# Patient Record
Sex: FEMALE | Race: WHITE | NOT HISPANIC OR LATINO | Employment: UNEMPLOYED | URBAN - NONMETROPOLITAN AREA
[De-identification: names, ages, dates, MRNs, and addresses within clinical notes are randomized per-mention and may not be internally consistent; named-entity substitution may affect disease eponyms.]

---
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
## 2019-03-27 ENCOUNTER — TRANSCRIBE ORDERS (OUTPATIENT)
Dept: ADMINISTRATIVE | Facility: HOSPITAL | Age: 19
End: 2019-03-27

## 2019-03-27 DIAGNOSIS — K31.84 GASTROPARESIS: Primary | ICD-10-CM

## 2019-06-08 ENCOUNTER — APPOINTMENT (OUTPATIENT)
Dept: GENERAL RADIOLOGY | Facility: HOSPITAL | Age: 19
End: 2019-06-08

## 2019-06-08 ENCOUNTER — APPOINTMENT (OUTPATIENT)
Dept: CT IMAGING | Facility: HOSPITAL | Age: 19
End: 2019-06-08

## 2019-06-08 ENCOUNTER — HOSPITAL ENCOUNTER (OUTPATIENT)
Facility: HOSPITAL | Age: 19
Setting detail: OBSERVATION
Discharge: HOME | End: 2019-06-09
Attending: EMERGENCY MEDICINE | Admitting: FAMILY MEDICINE

## 2019-06-08 DIAGNOSIS — N39.0 URINARY TRACT INFECTION WITHOUT HEMATURIA, SITE UNSPECIFIED: Primary | ICD-10-CM

## 2019-06-08 DIAGNOSIS — R50.9 FEVER, UNSPECIFIED FEVER CAUSE: ICD-10-CM

## 2019-06-08 DIAGNOSIS — R10.9 FLANK PAIN: ICD-10-CM

## 2019-06-08 PROBLEM — R59.0 POSTERIOR CERVICAL LYMPHADENOPATHY: Status: ACTIVE | Noted: 2019-06-08

## 2019-06-08 PROBLEM — Z86.19 HISTORY OF MONONUCLEOSIS: Status: ACTIVE | Noted: 2019-06-08

## 2019-06-08 PROBLEM — R52 INTRACTABLE PAIN: Status: ACTIVE | Noted: 2019-06-08

## 2019-06-08 PROBLEM — R30.0 DYSURIA: Status: ACTIVE | Noted: 2019-06-08

## 2019-06-08 PROBLEM — M62.830 SPASM OF MUSCLE OF LOWER BACK: Status: ACTIVE | Noted: 2019-06-08

## 2019-06-08 PROBLEM — Z86.19 HISTORY OF HELICOBACTER PYLORI INFECTION: Status: ACTIVE | Noted: 2019-06-08

## 2019-06-08 PROBLEM — R59.1 LYMPHADENOPATHY: Status: ACTIVE | Noted: 2019-06-08

## 2019-06-08 LAB
ALBUMIN SERPL-MCNC: 4.1 G/DL (ref 3.5–5.2)
ALBUMIN/GLOB SERPL: 1.3 G/DL
ALP SERPL-CCNC: 71 U/L (ref 39–117)
ALT SERPL W P-5'-P-CCNC: 7 U/L (ref 1–33)
AMPHET+METHAMPHET UR QL: NEGATIVE
AMPHETAMINES UR QL: NEGATIVE
ANION GAP SERPL CALCULATED.3IONS-SCNC: 14 MMOL/L
AST SERPL-CCNC: 15 U/L (ref 1–32)
B-HCG UR QL: NEGATIVE
BACTERIA UR QL AUTO: ABNORMAL /HPF
BARBITURATES UR QL SCN: NEGATIVE
BASOPHILS # BLD AUTO: 0.02 10*3/MM3 (ref 0–0.2)
BASOPHILS NFR BLD AUTO: 0.2 % (ref 0–1.5)
BENZODIAZ UR QL SCN: POSITIVE
BILIRUB SERPL-MCNC: 0.4 MG/DL (ref 0.2–1.2)
BILIRUB UR QL STRIP: NEGATIVE
BUN BLD-MCNC: 7 MG/DL (ref 6–20)
BUN/CREAT SERPL: 8.2 (ref 7–25)
BUPRENORPHINE SERPL-MCNC: NEGATIVE NG/ML
CALCIUM SPEC-SCNC: 9 MG/DL (ref 8.6–10.5)
CANNABINOIDS SERPL QL: POSITIVE
CHLORIDE SERPL-SCNC: 100 MMOL/L (ref 98–107)
CLARITY UR: CLEAR
CO2 SERPL-SCNC: 22 MMOL/L (ref 22–29)
COCAINE UR QL: NEGATIVE
COLOR UR: YELLOW
CREAT BLD-MCNC: 0.85 MG/DL (ref 0.57–1)
D-LACTATE SERPL-SCNC: 1.2 MMOL/L (ref 0.5–2)
DEPRECATED RDW RBC AUTO: 48.7 FL (ref 37–54)
EOSINOPHIL # BLD AUTO: 0 10*3/MM3 (ref 0–0.4)
EOSINOPHIL NFR BLD AUTO: 0 % (ref 0.3–6.2)
ERYTHROCYTE [DISTWIDTH] IN BLOOD BY AUTOMATED COUNT: 14.6 % (ref 12.3–15.4)
FLUAV AG NPH QL: NEGATIVE
FLUBV AG NPH QL IA: NEGATIVE
GFR SERPL CREATININE-BSD FRML MDRD: 86 ML/MIN/1.73
GLOBULIN UR ELPH-MCNC: 3.1 GM/DL
GLUCOSE BLD-MCNC: 90 MG/DL (ref 65–99)
GLUCOSE UR STRIP-MCNC: NEGATIVE MG/DL
HCT VFR BLD AUTO: 39.3 % (ref 34–46.6)
HETEROPH AB SER QL LA: NEGATIVE
HGB BLD-MCNC: 12.5 G/DL (ref 12–15.9)
HGB UR QL STRIP.AUTO: ABNORMAL
HOLD SPECIMEN: NORMAL
HOLD SPECIMEN: NORMAL
HYALINE CASTS UR QL AUTO: ABNORMAL /LPF
IMM GRANULOCYTES # BLD AUTO: 0.12 10*3/MM3 (ref 0–0.05)
IMM GRANULOCYTES NFR BLD AUTO: 1.2 % (ref 0–0.5)
INTERNAL NEGATIVE CONTROL: NORMAL
INTERNAL POSITIVE CONTROL: NORMAL
KETONES UR QL STRIP: ABNORMAL
LEUKOCYTE ESTERASE UR QL STRIP.AUTO: ABNORMAL
LIPASE SERPL-CCNC: 11 U/L (ref 13–60)
LYMPHOCYTES # BLD AUTO: 0.76 10*3/MM3 (ref 0.7–3.1)
LYMPHOCYTES NFR BLD AUTO: 7.4 % (ref 19.6–45.3)
Lab: NORMAL
MCH RBC QN AUTO: 28.7 PG (ref 26.6–33)
MCHC RBC AUTO-ENTMCNC: 31.8 G/DL (ref 31.5–35.7)
MCV RBC AUTO: 90.1 FL (ref 79–97)
METHADONE UR QL SCN: NEGATIVE
MONOCYTES # BLD AUTO: 0.95 10*3/MM3 (ref 0.1–0.9)
MONOCYTES NFR BLD AUTO: 9.2 % (ref 5–12)
NEUTROPHILS # BLD AUTO: 8.46 10*3/MM3 (ref 1.7–7)
NEUTROPHILS NFR BLD AUTO: 82 % (ref 42.7–76)
NITRITE UR QL STRIP: NEGATIVE
NRBC BLD AUTO-RTO: 0 /100 WBC (ref 0–0.2)
OPIATES UR QL: NEGATIVE
OXYCODONE UR QL SCN: NEGATIVE
PCP UR QL SCN: NEGATIVE
PH UR STRIP.AUTO: 6 [PH] (ref 5–8)
PLATELET # BLD AUTO: 169 10*3/MM3 (ref 140–450)
PMV BLD AUTO: 10 FL (ref 6–12)
POTASSIUM BLD-SCNC: 3.5 MMOL/L (ref 3.5–5.2)
PROCALCITONIN SERPL-MCNC: 0.11 NG/ML (ref 0.1–0.25)
PROPOXYPH UR QL: NEGATIVE
PROT SERPL-MCNC: 7.2 G/DL (ref 6–8.5)
PROT UR QL STRIP: ABNORMAL
RBC # BLD AUTO: 4.36 10*6/MM3 (ref 3.77–5.28)
RBC # UR: ABNORMAL /HPF
REF LAB TEST METHOD: ABNORMAL
S PYO AG THROAT QL: NEGATIVE
SODIUM BLD-SCNC: 136 MMOL/L (ref 136–145)
SP GR UR STRIP: 1.03 (ref 1–1.03)
SQUAMOUS #/AREA URNS HPF: ABNORMAL /HPF
TRICYCLICS UR QL SCN: NEGATIVE
UROBILINOGEN UR QL STRIP: ABNORMAL
WBC NRBC COR # BLD: 10.31 10*3/MM3 (ref 3.4–10.8)
WBC UR QL AUTO: ABNORMAL /HPF
WHOLE BLOOD HOLD SPECIMEN: NORMAL
WHOLE BLOOD HOLD SPECIMEN: NORMAL

## 2019-06-08 PROCEDURE — 99284 EMERGENCY DEPT VISIT MOD MDM: CPT

## 2019-06-08 PROCEDURE — 96366 THER/PROPH/DIAG IV INF ADDON: CPT

## 2019-06-08 PROCEDURE — 96361 HYDRATE IV INFUSION ADD-ON: CPT

## 2019-06-08 PROCEDURE — 87081 CULTURE SCREEN ONLY: CPT | Performed by: PHYSICIAN ASSISTANT

## 2019-06-08 PROCEDURE — 85025 COMPLETE CBC W/AUTO DIFF WBC: CPT | Performed by: EMERGENCY MEDICINE

## 2019-06-08 PROCEDURE — 74177 CT ABD & PELVIS W/CONTRAST: CPT

## 2019-06-08 PROCEDURE — 25010000002 METHYLPREDNISOLONE PER 125 MG: Performed by: PHYSICIAN ASSISTANT

## 2019-06-08 PROCEDURE — 86308 HETEROPHILE ANTIBODY SCREEN: CPT | Performed by: PHYSICIAN ASSISTANT

## 2019-06-08 PROCEDURE — 81001 URINALYSIS AUTO W/SCOPE: CPT | Performed by: EMERGENCY MEDICINE

## 2019-06-08 PROCEDURE — 83605 ASSAY OF LACTIC ACID: CPT | Performed by: PHYSICIAN ASSISTANT

## 2019-06-08 PROCEDURE — G0378 HOSPITAL OBSERVATION PER HR: HCPCS

## 2019-06-08 PROCEDURE — 87804 INFLUENZA ASSAY W/OPTIC: CPT | Performed by: PHYSICIAN ASSISTANT

## 2019-06-08 PROCEDURE — 25010000002 KETOROLAC TROMETHAMINE PER 15 MG: Performed by: PHYSICIAN ASSISTANT

## 2019-06-08 PROCEDURE — 87040 BLOOD CULTURE FOR BACTERIA: CPT | Performed by: PHYSICIAN ASSISTANT

## 2019-06-08 PROCEDURE — 71046 X-RAY EXAM CHEST 2 VIEWS: CPT

## 2019-06-08 PROCEDURE — 96376 TX/PRO/DX INJ SAME DRUG ADON: CPT

## 2019-06-08 PROCEDURE — 25010000002 KETOROLAC TROMETHAMINE PER 15 MG: Performed by: NURSE PRACTITIONER

## 2019-06-08 PROCEDURE — 96367 TX/PROPH/DG ADDL SEQ IV INF: CPT

## 2019-06-08 PROCEDURE — 80306 DRUG TEST PRSMV INSTRMNT: CPT | Performed by: NURSE PRACTITIONER

## 2019-06-08 PROCEDURE — 99219 PR INITIAL OBSERVATION CARE/DAY 50 MINUTES: CPT | Performed by: HOSPITALIST

## 2019-06-08 PROCEDURE — 96365 THER/PROPH/DIAG IV INF INIT: CPT

## 2019-06-08 PROCEDURE — 25010000002 DIPHENHYDRAMINE PER 50 MG: Performed by: PHYSICIAN ASSISTANT

## 2019-06-08 PROCEDURE — 96375 TX/PRO/DX INJ NEW DRUG ADDON: CPT

## 2019-06-08 PROCEDURE — 87880 STREP A ASSAY W/OPTIC: CPT | Performed by: PHYSICIAN ASSISTANT

## 2019-06-08 PROCEDURE — 25010000002 VANCOMYCIN 10 G RECONSTITUTED SOLUTION: Performed by: PHYSICIAN ASSISTANT

## 2019-06-08 PROCEDURE — 83690 ASSAY OF LIPASE: CPT | Performed by: EMERGENCY MEDICINE

## 2019-06-08 PROCEDURE — 80053 COMPREHEN METABOLIC PANEL: CPT | Performed by: EMERGENCY MEDICINE

## 2019-06-08 PROCEDURE — 84145 PROCALCITONIN (PCT): CPT | Performed by: PHYSICIAN ASSISTANT

## 2019-06-08 PROCEDURE — 81025 URINE PREGNANCY TEST: CPT | Performed by: EMERGENCY MEDICINE

## 2019-06-08 PROCEDURE — 25010000002 MEROPENEM: Performed by: PHYSICIAN ASSISTANT

## 2019-06-08 PROCEDURE — 87086 URINE CULTURE/COLONY COUNT: CPT | Performed by: PHYSICIAN ASSISTANT

## 2019-06-08 PROCEDURE — 25010000002 IOPAMIDOL 61 % SOLUTION: Performed by: EMERGENCY MEDICINE

## 2019-06-08 PROCEDURE — 25010000002 LORAZEPAM PER 2 MG: Performed by: EMERGENCY MEDICINE

## 2019-06-08 RX ORDER — SODIUM CHLORIDE 0.9 % (FLUSH) 0.9 %
3-10 SYRINGE (ML) INJECTION AS NEEDED
Status: DISCONTINUED | OUTPATIENT
Start: 2019-06-08 | End: 2019-06-09 | Stop reason: HOSPADM

## 2019-06-08 RX ORDER — FAMOTIDINE 20 MG/1
40 TABLET, FILM COATED ORAL DAILY
Status: DISCONTINUED | OUTPATIENT
Start: 2019-06-09 | End: 2019-06-09 | Stop reason: HOSPADM

## 2019-06-08 RX ORDER — ONDANSETRON 2 MG/ML
4 INJECTION INTRAMUSCULAR; INTRAVENOUS EVERY 6 HOURS PRN
Status: DISCONTINUED | OUTPATIENT
Start: 2019-06-08 | End: 2019-06-09 | Stop reason: HOSPADM

## 2019-06-08 RX ORDER — KETOROLAC TROMETHAMINE 15 MG/ML
15 INJECTION, SOLUTION INTRAMUSCULAR; INTRAVENOUS EVERY 6 HOURS PRN
Status: DISCONTINUED | OUTPATIENT
Start: 2019-06-08 | End: 2019-06-09 | Stop reason: HOSPADM

## 2019-06-08 RX ORDER — LORAZEPAM 2 MG/ML
0.5 INJECTION INTRAMUSCULAR ONCE
Status: COMPLETED | OUTPATIENT
Start: 2019-06-08 | End: 2019-06-08

## 2019-06-08 RX ORDER — NORETHINDRONE ACETATE AND ETHINYL ESTRADIOL 1; 5 MG/1; UG/1
1 TABLET ORAL DAILY
Status: DISCONTINUED | OUTPATIENT
Start: 2019-06-08 | End: 2019-06-09 | Stop reason: HOSPADM

## 2019-06-08 RX ORDER — FAMOTIDINE 10 MG/ML
20 INJECTION, SOLUTION INTRAVENOUS ONCE
Status: COMPLETED | OUTPATIENT
Start: 2019-06-08 | End: 2019-06-08

## 2019-06-08 RX ORDER — SODIUM CHLORIDE 0.9 % (FLUSH) 0.9 %
10 SYRINGE (ML) INJECTION AS NEEDED
Status: DISCONTINUED | OUTPATIENT
Start: 2019-06-08 | End: 2019-06-09 | Stop reason: HOSPADM

## 2019-06-08 RX ORDER — ACETAMINOPHEN 325 MG/1
650 TABLET ORAL EVERY 4 HOURS PRN
Status: DISCONTINUED | OUTPATIENT
Start: 2019-06-08 | End: 2019-06-09 | Stop reason: HOSPADM

## 2019-06-08 RX ORDER — KETOROLAC TROMETHAMINE 15 MG/ML
10 INJECTION, SOLUTION INTRAMUSCULAR; INTRAVENOUS ONCE
Status: COMPLETED | OUTPATIENT
Start: 2019-06-08 | End: 2019-06-08

## 2019-06-08 RX ORDER — ONDANSETRON 4 MG/1
4 TABLET, FILM COATED ORAL EVERY 6 HOURS PRN
Status: DISCONTINUED | OUTPATIENT
Start: 2019-06-08 | End: 2019-06-09 | Stop reason: HOSPADM

## 2019-06-08 RX ORDER — SODIUM CHLORIDE 0.9 % (FLUSH) 0.9 %
3 SYRINGE (ML) INJECTION EVERY 12 HOURS SCHEDULED
Status: DISCONTINUED | OUTPATIENT
Start: 2019-06-08 | End: 2019-06-09 | Stop reason: HOSPADM

## 2019-06-08 RX ORDER — SULFAMETHOXAZOLE AND TRIMETHOPRIM 800; 160 MG/1; MG/1
1 TABLET ORAL 2 TIMES DAILY
COMMUNITY
End: 2019-09-10

## 2019-06-08 RX ORDER — ZOLPIDEM TARTRATE 5 MG/1
5 TABLET ORAL NIGHTLY PRN
Status: DISCONTINUED | OUTPATIENT
Start: 2019-06-08 | End: 2019-06-09 | Stop reason: HOSPADM

## 2019-06-08 RX ORDER — DIPHENHYDRAMINE HYDROCHLORIDE 50 MG/ML
25 INJECTION INTRAMUSCULAR; INTRAVENOUS ONCE
Status: COMPLETED | OUTPATIENT
Start: 2019-06-08 | End: 2019-06-08

## 2019-06-08 RX ORDER — METHYLPREDNISOLONE SODIUM SUCCINATE 125 MG/2ML
125 INJECTION, POWDER, LYOPHILIZED, FOR SOLUTION INTRAMUSCULAR; INTRAVENOUS ONCE
Status: COMPLETED | OUTPATIENT
Start: 2019-06-08 | End: 2019-06-08

## 2019-06-08 RX ORDER — HYDROXYZINE HYDROCHLORIDE 25 MG/1
25 TABLET, FILM COATED ORAL 3 TIMES DAILY PRN
Status: DISCONTINUED | OUTPATIENT
Start: 2019-06-08 | End: 2019-06-09 | Stop reason: HOSPADM

## 2019-06-08 RX ORDER — ACETAMINOPHEN 500 MG
1000 TABLET ORAL ONCE
Status: COMPLETED | OUTPATIENT
Start: 2019-06-08 | End: 2019-06-08

## 2019-06-08 RX ORDER — SODIUM CHLORIDE 9 MG/ML
75 INJECTION, SOLUTION INTRAVENOUS CONTINUOUS
Status: ACTIVE | OUTPATIENT
Start: 2019-06-08 | End: 2019-06-09

## 2019-06-08 RX ADMIN — DIPHENHYDRAMINE HYDROCHLORIDE 25 MG: 50 INJECTION INTRAMUSCULAR; INTRAVENOUS at 17:46

## 2019-06-08 RX ADMIN — SODIUM CHLORIDE 2178 ML: 9 INJECTION, SOLUTION INTRAVENOUS at 14:18

## 2019-06-08 RX ADMIN — KETOROLAC TROMETHAMINE 10 MG: 15 INJECTION, SOLUTION INTRAMUSCULAR; INTRAVENOUS at 14:21

## 2019-06-08 RX ADMIN — KETOROLAC TROMETHAMINE 15 MG: 15 INJECTION, SOLUTION INTRAMUSCULAR; INTRAVENOUS at 21:27

## 2019-06-08 RX ADMIN — LORAZEPAM 0.5 MG: 2 INJECTION INTRAMUSCULAR; INTRAVENOUS at 16:45

## 2019-06-08 RX ADMIN — FAMOTIDINE 20 MG: 10 INJECTION, SOLUTION INTRAVENOUS at 17:48

## 2019-06-08 RX ADMIN — VANCOMYCIN HYDROCHLORIDE 1250 MG: 10 INJECTION, POWDER, LYOPHILIZED, FOR SOLUTION INTRAVENOUS at 16:40

## 2019-06-08 RX ADMIN — IOPAMIDOL 90 ML: 612 INJECTION, SOLUTION INTRAVENOUS at 15:21

## 2019-06-08 RX ADMIN — ACETAMINOPHEN 1000 MG: 500 TABLET, FILM COATED ORAL at 15:56

## 2019-06-08 RX ADMIN — MEROPENEM 1 G: 1 INJECTION, POWDER, FOR SOLUTION INTRAVENOUS at 15:57

## 2019-06-08 RX ADMIN — ZOLPIDEM TARTRATE 5 MG: 5 TABLET ORAL at 21:26

## 2019-06-08 RX ADMIN — SODIUM CHLORIDE, PRESERVATIVE FREE 3 ML: 5 INJECTION INTRAVENOUS at 20:32

## 2019-06-08 RX ADMIN — METHYLPREDNISOLONE SODIUM SUCCINATE 125 MG: 125 INJECTION, POWDER, FOR SOLUTION INTRAMUSCULAR; INTRAVENOUS at 17:50

## 2019-06-08 RX ADMIN — SODIUM CHLORIDE 75 ML/HR: 9 INJECTION, SOLUTION INTRAVENOUS at 20:31

## 2019-06-08 NOTE — ED PROVIDER NOTES
Subjective   Isatu Dave is a 18 y.o. female who presents to the ED with complaints of abdominal pain. The patient reports that she went to [facility] yesterday and she was diagnosed with a UTI. She was prescribed Bactrim. She is experiencing dysuria, abdominal cramping, fever, chills, hematuria, cough, lower back pain, vomiting, neck pain, and swollen lymph nodes in her neck. She denies sore throat, hematochezia, vaginal discharge, and diarrhea. She has a history of mono, but was tested yesterday at [facility] and the result was negative. She also has a history of gastroparesis and H. Pylori. She has had an appendectomy. There are no other acute complaints at this time.         History provided by:  Patient  Abdominal Pain   Pain location:  Suprapubic  Pain quality: cramping and sharp    Pain radiates to:  Does not radiate  Pain severity:  Moderate  Onset quality:  Sudden  Timing:  Constant  Chronicity:  New  Associated symptoms: chills, cough, dysuria, fever, hematuria and vomiting    Associated symptoms: no diarrhea, no hematochezia, no sore throat and no vaginal discharge        Review of Systems   Constitutional: Positive for chills and fever.   HENT: Negative for sore throat.    Respiratory: Positive for cough.    Gastrointestinal: Positive for abdominal pain and vomiting. Negative for blood in stool, diarrhea and hematochezia.   Genitourinary: Positive for dysuria and hematuria. Negative for vaginal discharge.   Musculoskeletal: Positive for back pain and neck pain.       Past Medical History:   Diagnosis Date   • Gastroparesis    • H. pylori infection        Allergies   Allergen Reactions   • Darren Flavor Anaphylaxis   • Morphine Anaphylaxis     Throat closes up   • Oxycodone Anaphylaxis     Throat closes up   • Penicillins Anaphylaxis   • Pineapple Anaphylaxis   • Hydrocodone Hives       Past Surgical History:   Procedure Laterality Date   • APPENDECTOMY     • SHOULDER SURGERY     • WISDOM TOOTH EXTRACTION          History reviewed. No pertinent family history.    Social History     Socioeconomic History   • Marital status: Single     Spouse name: Not on file   • Number of children: Not on file   • Years of education: Not on file   • Highest education level: Not on file   Tobacco Use   • Smoking status: Never Smoker   Substance and Sexual Activity   • Alcohol use: No   • Drug use: No         Objective   Physical Exam   Constitutional: She is oriented to person, place, and time. She appears well-developed and well-nourished.  Non-toxic appearance. No distress.   Patient is tearful and very anxious. Febrile with a temperature of 100.3.    HENT:   Head: Normocephalic and atraumatic.   Eyes: Conjunctivae are normal. No scleral icterus.   Neck: Normal range of motion. Neck supple.   Cervical chain adenopathy. No meningismus.    Cardiovascular: Normal rate, regular rhythm and normal heart sounds.   Pulmonary/Chest: Effort normal and breath sounds normal.   Abdominal: Soft. She exhibits no mass. There is tenderness. There is no rebound and no guarding.   Mild right lower quadrant tenderness (status post appendectomy). No flank tenderness. Mild suprapubic tenderness. No palpable organomegaly. No pulsatile masses.    Musculoskeletal: Normal range of motion.   Able to flex knees and hips to chest without difficulty.    Neurological: She is alert and oriented to person, place, and time.   Skin: Skin is warm and dry. No rash noted.   Psychiatric: Her behavior is normal. Her mood appears anxious.   Nursing note and vitals reviewed.      Procedures         ED Course  ED Course as of Jun 08 1751   Sat Jun 08, 2019   1451 Squamous Epithelial Cells, UA: (!) 7-12 [TG]   1451 WBC, UA: (!) 13-20 [TG]   1451 RBC, UA: (!) 7-12 [TG]   1451 Nitrite, UA: Negative [TG]   1451 Ketones, UA: (!) >=160 mg/dL (4+) [TG]   1451 WBC: 10.31 [TG]   1516 IMPRESSION:  Mild peribronchial thickening perhaps viral syndrome. No  other evidence of active chest  disease.  [TG]   1541 Procalcitonin: 0.11 [TG]   1605 IMPRESSION:  Decompressed appearance of the colon, but no evidence of  inflammation to suggest colitis. Mildly prominent proximal small bowel  loops, which are fluid-filled. Consider mild changes of enteritis.     [TG]   1705 Called to patient's bedside, patient complaining of worsening spasms in back, shortness of breath and mid chest pain.  [TG]   1748 D/W Dr. VALER[facility]  [TG]      ED Course User Index  [TG] Aaron Bergeron PA-C     Recent Results (from the past 24 hour(s))   Comprehensive Metabolic Panel    Collection Time: 06/08/19  1:58 PM   Result Value Ref Range    Glucose 90 65 - 99 mg/dL    BUN 7 6 - 20 mg/dL    Creatinine 0.85 0.57 - 1.00 mg/dL    Sodium 136 136 - 145 mmol/L    Potassium 3.5 3.5 - 5.2 mmol/L    Chloride 100 98 - 107 mmol/L    CO2 22.0 22.0 - 29.0 mmol/L    Calcium 9.0 8.6 - 10.5 mg/dL    Total Protein 7.2 6.0 - 8.5 g/dL    Albumin 4.10 3.50 - 5.20 g/dL    ALT (SGPT) 7 1 - 33 U/L    AST (SGOT) 15 1 - 32 U/L    Alkaline Phosphatase 71 39 - 117 U/L    Total Bilirubin 0.4 0.2 - 1.2 mg/dL    eGFR Non African Amer 86 >60 mL/min/1.73    Globulin 3.1 gm/dL    A/G Ratio 1.3 g/dL    BUN/Creatinine Ratio 8.2 7.0 - 25.0    Anion Gap 14.0 mmol/L   Lipase    Collection Time: 06/08/19  1:58 PM   Result Value Ref Range    Lipase 11 (L) 13 - 60 U/L   Light Blue Top    Collection Time: 06/08/19  1:58 PM   Result Value Ref Range    Extra Tube hold for add-on    Green Top (Gel)    Collection Time: 06/08/19  1:58 PM   Result Value Ref Range    Extra Tube Hold for add-ons.    Lavender Top    Collection Time: 06/08/19  1:58 PM   Result Value Ref Range    Extra Tube hold for add-on    Gold Top - SST    Collection Time: 06/08/19  1:58 PM   Result Value Ref Range    Extra Tube Hold for add-ons.    CBC Auto Differential    Collection Time: 06/08/19  1:58 PM   Result Value Ref Range    WBC 10.31 3.40 - 10.80 10*3/mm3    RBC 4.36 3.77 - 5.28 10*6/mm3     Hemoglobin 12.5 12.0 - 15.9 g/dL    Hematocrit 39.3 34.0 - 46.6 %    MCV 90.1 79.0 - 97.0 fL    MCH 28.7 26.6 - 33.0 pg    MCHC 31.8 31.5 - 35.7 g/dL    RDW 14.6 12.3 - 15.4 %    RDW-SD 48.7 37.0 - 54.0 fl    MPV 10.0 6.0 - 12.0 fL    Platelets 169 140 - 450 10*3/mm3    Neutrophil % 82.0 (H) 42.7 - 76.0 %    Lymphocyte % 7.4 (L) 19.6 - 45.3 %    Monocyte % 9.2 5.0 - 12.0 %    Eosinophil % 0.0 (L) 0.3 - 6.2 %    Basophil % 0.2 0.0 - 1.5 %    Immature Grans % 1.2 (H) 0.0 - 0.5 %    Neutrophils, Absolute 8.46 (H) 1.70 - 7.00 10*3/mm3    Lymphocytes, Absolute 0.76 0.70 - 3.10 10*3/mm3    Monocytes, Absolute 0.95 (H) 0.10 - 0.90 10*3/mm3    Eosinophils, Absolute 0.00 0.00 - 0.40 10*3/mm3    Basophils, Absolute 0.02 0.00 - 0.20 10*3/mm3    Immature Grans, Absolute 0.12 (H) 0.00 - 0.05 10*3/mm3    nRBC 0.0 0.0 - 0.2 /100 WBC   Procalcitonin    Collection Time: 06/08/19  1:58 PM   Result Value Ref Range    Procalcitonin 0.11 0.10 - 0.25 ng/mL   Mononucleosis Screen    Collection Time: 06/08/19  1:58 PM   Result Value Ref Range    Monospot Negative Negative   Urinalysis With Microscopic If Indicated (No Culture) - Urine, Clean Catch    Collection Time: 06/08/19  1:59 PM   Result Value Ref Range    Color, UA Yellow Yellow, Straw    Appearance, UA Clear Clear    pH, UA 6.0 5.0 - 8.0    Specific Gravity, UA 1.026 1.001 - 1.030    Glucose, UA Negative Negative    Ketones, UA >=160 mg/dL (4+) (A) Negative    Bilirubin, UA Negative Negative    Blood, UA Large (3+) (A) Negative    Protein, UA Trace (A) Negative    Leuk Esterase, UA Trace (A) Negative    Nitrite, UA Negative Negative    Urobilinogen, UA 1.0 E.U./dL 0.2 - 1.0 E.U./dL   Urinalysis, Microscopic Only - Urine, Clean Catch    Collection Time: 06/08/19  1:59 PM   Result Value Ref Range    RBC, UA 7-12 (A) None Seen, 0-2 /HPF    WBC, UA 13-20 (A) None Seen, 0-2 /HPF    Bacteria, UA None Seen None Seen, Trace /HPF    Squamous Epithelial Cells, UA 7-12 (A) None Seen, 0-2  /HPF    Hyaline Casts, UA 13-20 0 - 6 /LPF    Methodology Manual Light Microscopy    POCT pregnancy, urine    Collection Time: 06/08/19  2:01 PM   Result Value Ref Range    HCG, Urine, QL Negative Negative    Lot Number [id number]     Internal Positive Control Presumptive Positive     Internal Negative Control Presumptive Negative    Rapid Strep A Screen - Swab, Throat    Collection Time: 06/08/19  2:28 PM   Result Value Ref Range    Strep A Ag Negative Negative   Influenza Antigen, Rapid - Swab, Nasopharynx    Collection Time: 06/08/19  2:28 PM   Result Value Ref Range    Influenza A Ag, EIA Negative Negative    Influenza B Ag, EIA Negative Negative   Lactic Acid, Plasma    Collection Time: 06/08/19  2:33 PM   Result Value Ref Range    Lactate 1.2 0.5 - 2.0 mmol/L     Note: In addition to lab results from this visit, the labs listed above may include labs taken at another facility or during a different encounter within the last 24 hours. Please correlate lab times with ED admission and discharge times for further clarification of the services performed during this visit.    CT Abdomen Pelvis With Contrast   ED Interpretation   Decompressed appearance of the colon, but no evidence of   inflammation to suggest colitis. Mildly prominent proximal small bowel   loops, which are fluid-filled. Consider mild changes of enteritis.              Preliminary Result   Decompressed appearance of the colon, but no evidence of   inflammation to suggest colitis. Mildly prominent proximal small bowel   loops which are fluid-filled. Consider mild changes of enteritis.       DICTATED:   06/08/2019   EDITED/ls :   06/08/2019               XR Chest PA & Lateral   ED Interpretation   Mild peribronchial thickening perhaps viral syndrome. No   other evidence of active chest disease.              Preliminary Result   Mild peribronchial thickening, perhaps viral syndrome. No   other evidence of active chest disease.       DICTATED:    06/08/2019   EDITED/ls :   06/08/2019                 Vitals:    06/08/19 1601 06/08/19 1630 06/08/19 1700 06/08/19 1730   BP: 116/77 104/71 113/65 134/95   BP Location: Left arm      Patient Position: Lying      Pulse:  96     Resp: 22 18     Temp:       TempSrc:       SpO2:  100% 100% 100%   Weight:       Height:         Medications   sodium chloride 0.9 % flush 10 mL (not administered)   vancomycin 1250 mg/250 mL 0.9% NS IVPB ([facility]) (1,250 mg Intravenous New Bag 6/8/19 1640)   sodium chloride 0.9 % bolus 2,178 mL (0 mL Intravenous Stopped 6/8/19 1634)   ketorolac (TORADOL) injection 10 mg (10 mg Intravenous Given 6/8/19 1421)   meropenem (MERREM) 1 g/100 mL 0.9% NS VTB ([facility]) (0 g Intravenous Stopped 6/8/19 1635)   iopamidol (ISOVUE-300) 61 % injection 100 mL (90 mL Intravenous Given 6/8/19 1521)   acetaminophen (TYLENOL) tablet 1,000 mg (1,000 mg Oral Given 6/8/19 1556)   LORazepam (ATIVAN) injection 0.5 mg (0.5 mg Intravenous Given 6/8/19 1645)   diphenhydrAMINE (BENADRYL) injection 25 mg (25 mg Intravenous Given 6/8/19 1746)   methylPREDNISolone sodium succinate (SOLU-Medrol) injection 125 mg (125 mg Intravenous Given 6/8/19 1750)   famotidine (PEPCID) injection 20 mg (20 mg Intravenous Given 6/8/19 1748)     ECG/EMG Results (last 24 hours)     ** No results found for the last 24 hours. **        No orders to display                       MDM    Final diagnoses:   Urinary tract infection without hematuria, site unspecified   Flank pain   Fever, unspecified fever cause       Documentation assistance provided by kiesha Byrd.  Information recorded by the kiesha was done at my direction and has been verified and validated by me.     Mariajose Byrd  06/08/19 [id number]       Mariajose Byrd  06/08/19 [id number]       Aaron Bergeron PA-C  06/08/19 0323

## 2019-06-08 NOTE — H&P
"    [facility] Medicine Services  HISTORY AND PHYSICAL    Patient Name: Shyann Dave  : 2000  MRN: [id number]  Primary Care Physician: Provider, No Known  Date of admission: 2019      Subjective   Subjective     Chief Complaint:  Suprapubic abd pain, bilat flank pain, dysuria, n/v, fever  Swollen lymph nodes      HPI:  Shyann Dave is a 18 y.o. female with PMH of active swimmer and frequent related UTI's since approx 6 yo.  Her PCP had provider her with \"supply bottle\" of bactrim by her report to take as needed when onset of UTI occurred.  Pt with other hx of mononucleosis x 3 with last episode in Spring of 2018, menstrual migraines, H-pylori (just finished abx/tx in  of this year and followed by Dr TEENA Hernandez), gastroparesis, and frequent PNA.      Pt presents to ER with cc of dysuria and urgency x 5 days.  Started taking her bactrim on Monday.  Symtpoms persisted.  Then developed bilateral LAD to neck on approx Wednesday.  Approx 0 this am awoke with sudden worsening: fever, chills, n/v, cough, LBP, flank pain, worsening of dysuria with hematuria (although on her period early),  And worsening neck/throat lymph node enlargement and associated pain.  Mono spot, influenza A/B, and strep negative.  On Er eval her labs were essentially wnl.  WBC of 10.  Temp of 100.3.  UA with lg blood and trace leuks, but no nitrates or bacteria. Bilateral CVA ttp documented. Significant LBP/spasms and overall body pain reported.  CXR with peribronchial thickening representing possible viral syndrome. CT a/p with mild changes of possible enteritis. She was treated with vanc and merrem IV due to anaphylaxis to PCNs.  Reportedly was screaming/anxious and got toradol and ativan.  Then developed \"red man symdrome\".  Rcvd solumedrol, benadryl and pepcid and improved.  She is currently seen resting back in bed.  Awake and alert.  Came to ER alone.  Appears fairly calm.  Reports " no flank pain, only LBP and neck/throat lymph node pain.  Will bed admitted to hospitalist service due to intractable pain for further monitoring/mgmt of possible UTI/pyelo, vs viral syndrome, vs reactivation of mono.      Review of Systems   Constitutional: Positive for appetite change, chills, fatigue and fever.   HENT: Positive for sore throat.    Eyes: Negative.    Respiratory: Positive for cough. Negative for chest tightness, shortness of breath and wheezing.    Cardiovascular: Negative.    Gastrointestinal: Positive for abdominal pain, nausea and vomiting. Negative for abdominal distention, anal bleeding, blood in stool and constipation.   Endocrine: Negative.    Genitourinary: Positive for difficulty urinating, dysuria, flank pain, hematuria and menstrual problem.        On menstrual cycle (but is 2 weeks early).  Dysuria and urgency x 5 days.  F/c, n/v, back/flank pain since 0300 am.     Musculoskeletal: Positive for back pain and neck pain.        Swollen bilateral neck lymph nodes x 2-3 days.    Skin: Negative.    Allergic/Immunologic: Negative.    Neurological: Positive for headaches. Negative for seizures.   Hematological: Negative.    Psychiatric/Behavioral: Negative for behavioral problems. The patient is nervous/anxious.           Otherwise complete ROS reviewed and is negative except as mentioned in the HPI.    Personal History     Past Medical History:   Diagnosis Date   • Gastroparesis    • GERD (gastroesophageal reflux disease)    • H. pylori infection    • History of Helicobacter pylori infection 6/8/2019    Just finished tx Jan 2019.  Followed by Dr TEENA Hernandez    • History of mononucleosis     x 3 epsoides.  Last being spring 2018   • Menstrual migraine     hx   • Pneumonia    • Substance abuse (CMS/[facility])     Jewel nicotine use, and occ marijauna use    • Urinary tract infection        Past Surgical History:   Procedure Laterality Date   • APPENDECTOMY     • SHOULDER SURGERY     • WISDOM TOOTH  EXTRACTION         Family History: family history is not on file. Otherwise pertinent FHx was reviewed and unremarkable.     Social History:  reports that she has been smoking electronic cigarette.  She has never used smokeless tobacco. She reports that she uses drugs. Drug: Marijuana. She reports that she does not drink alcohol.  Social History     Social History Narrative    Single.  Lives along in her own home.  Active.  Swimmer.         Medications:    Available home medication information reviewed.  Medications Prior to Admission   Medication Sig Dispense Refill Last Dose   • sulfamethoxazole-trimethoprim (BACTRIM DS,SEPTRA DS) 800-160 MG per tablet Take 1 tablet by mouth 2 (Two) Times a Day.      • norethindrone-ethinyl estradiol (FEMHRT 1/5) 1-5 MG-MCG tablet Take 1 tablet by mouth Daily.          Allergies   Allergen Reactions   • Darren Flavor Anaphylaxis   • Morphine Anaphylaxis     Throat closes up   • Oxycodone Anaphylaxis     Throat closes up   • Penicillins Anaphylaxis   • Pineapple Anaphylaxis   • Hydrocodone Hives       Objective   Objective     Vital Signs:   Temp:  [99 °F (37.2 °C)-100.3 °F (37.9 °C)] 99 °F (37.2 °C)  Heart Rate:  [83-96] 83  Resp:  [16-22] 16  BP: (104-134)/(56-95) 117/56        Physical Exam   Constitutional: Awake, alert. Sitting up in bed. Appears uncomfortable but calm currently.  No visitors at bs  Eyes: PERRLA, sclerae anicteric, no conjunctival injection  HENT: NCAT, mucous membranes moist  Neck: Supple, trachea midline. Significant bilateral tonsillar and posterior LAD noted.  Very ttp.   Respiratory: Clear to auscultation bilaterally A/P decreased at bases, nonlabored respirations on RA.  Sats wnl   Cardiovascular: RRR, no murmurs, rubs, or gallops, palpable pedal pulses bilaterally  Gastrointestinal: Positive bowel sounds, soft,  Nondistended.  Mild suprapubic ttp.  No guarding or rebound. NO CVA ttp on my exam.   Musculoskeletal: No bilateral ankle edema, no clubbing or  cyanosis to extremities.  JIMÉNEZ spontaneously  Psychiatric: Appropriate affect, cooperative and mildly anxious appearing   Neurologic: Oriented x 3, strength symmetric in all extremities, Cranial Nerves grossly intact to confrontation, speech clear and appropriate.  Follows commands   Skin: No rashes      Results Reviewed:  I have personally reviewed current lab, radiology, and data and agree.    Results from last 7 days   Lab Units 06/08/19  1358   WBC 10*3/mm3 10.31   HEMOGLOBIN g/dL 12.5   HEMATOCRIT % 39.3   PLATELETS 10*3/mm3 169     Results from last 7 days   Lab Units 06/08/19  1433 06/08/19  1358   SODIUM mmol/L  --  136   POTASSIUM mmol/L  --  3.5   CHLORIDE mmol/L  --  100   CO2 mmol/L  --  22.0   BUN mg/dL  --  7   CREATININE mg/dL  --  0.85   GLUCOSE mg/dL  --  90   CALCIUM mg/dL  --  9.0   ALT (SGPT) U/L  --  7   AST (SGOT) U/L  --  15   LACTATE mmol/L 1.2  --    PROCALCITONIN ng/mL  --  0.11     Estimated Creatinine Clearance: 122 mL/min (by C-G formula based on SCr of 0.85 mg/dL).  Brief Urine Lab Results  (Last result in the past 365 days)      Color   Clarity   Blood   Leuk Est   Nitrite   Protein   CREAT   Urine HCG        06/08/19 1401               Negative         Imaging Results (last 24 hours)     Procedure Component Value Units Date/Time    CT Abdomen Pelvis With Contrast [[id number]] Collected:  06/08/19 1543     Updated:  06/08/19 1612    Narrative:       EXAMINATION: CT ABDOMEN/PELVIS W/CONTRAST - 06/08/2019      INDICATION: Dysuria, fever, right lower abdominal pain. Status post  appendectomy.      TECHNIQUE: 5 mm post-IV contrast portal venous phase and delayed venous  phase images through the abdomen and pelvis.     The radiation dose reduction device was turned on for each scan per the  ALARA (As Low as Reasonably Achievable) protocol.     COMPARISON: NONE     FINDINGS: History indicates bilateral flank pain, abdominal pain,  dysuria, history of kidney infection, right lower quadrant  pain.  Previous appendectomy.     The included lower lungs appear grossly clear. There is mild fatty liver  change. Liver otherwise appears unremarkable. The spleen is not  enlarged. No significant abnormalities are seen of the gallbladder,  pancreas, adrenal glands, or kidneys. No upper abdominal free air,  ascites or adenopathy is seen. Small bowel loops are fluid filled but  nondistended proximally and appear decompressed distally. No abnormally  thickened bowel loops are seen. Small bowel distention is more apparent  on the CT scan  film with borderline dilated loops in the left  upper quadrant. The colon is relatively decompressed and appears to  contain mostly fluid. There is some formed stool only in the sigmoid and  rectum. Delayed images show contrast opacification of normal-caliber  collecting systems, ureters and bladder. Uterus and ovaries are not  enlarged. No asymmetric or abnormal enhancement is seen of the renal  parenchyma to suggest pancreatitis.       Impression:       Decompressed appearance of the colon, but no evidence of  inflammation to suggest colitis. Mildly prominent proximal small bowel  loops which are fluid-filled. Consider mild changes of enteritis.     DICTATED:   06/08/2019  EDITED/ls :   06/08/2019           XR Chest PA & Lateral [[id number]] Collected:  06/08/19 1508     Updated:  06/08/19 1521    Narrative:          EXAMINATION: XR CHEST, PA AND LATERAL - 06/08/2019     INDICATION: Fever, cough, dysuria and bodyaches.      COMPARISON: NONE     FINDINGS: The heart, mediastinum and pulmonary vasculature appear within  normal limits. Lungs appear well expanded and clear except for a  suggestion of mild peribronchial thickening.           Impression:       Mild peribronchial thickening, perhaps viral syndrome. No  other evidence of active chest disease.     DICTATED:   06/08/2019  EDITED/ls :   06/08/2019                    Assessment/Plan   Assessment / Plan     Active Hospital  "Problems    Diagnosis POA   • **Intractable pain [R52] Unknown     Priority: High   • Fever [R50.9] Unknown     Priority: High   • Dysuria [R30.0] Unknown     Priority: High   • Posterior cervical lymphadenopathy [R59.0] Unknown     Priority: Medium   • Acute UTI (urinary tract infection) [N39.0] Unknown   • Spasm of muscle of lower back [M62.830] Unknown   • History of mononucleosis [Z86.19] Unknown     X 3, last episode confirmed in spring 2018       • History of Helicobacter pylori infection [Z86.19] Unknown     Just finished tx Jan 2019.  Followed by Dr TEENA Hernandez        Shyann Dave is a 18 y.o. female with PMH of active swimmer and frequent related UTI's since approx 6 yo.  Her PCP had provider her with \"supply bottle\" of bactrim by her report to take as needed when onset of UTI occurred.  Pt with other hx of mononucleosis x 3 with last episode in Spring of 2018, menstrual migraines, H-pylori (just finished abx/tx in Jan of this year and followed by Dr TEENA Hernandez), gastroparesis, and frequent PNA.  To ER with dysuria, abd pain, f/c, n/v, cervical LAD.  Dxd with UTI/poss pyelo. Rcvd abx in ER.  Persisted with intractable pain to back, neck and general body in waves.  Admitted to hospitalist service for further management.       Assessment/Plan:    Intractable LBP, body pain  Possible UTI/Pyelo by report  (pt reports hx of freq UTIs since age 7)  CT a/p showed mild changes of poss enteritis   --anaphylaxis to multiple meds, pain and abx  --rcvd vanc and merrem in ER.  Then \"elfego syndrome\".  Given steroids, pepcid and benadryl and finished vanc.  --abx IVFs and monitor   --UA ? With no nitrates or bactria (had taken a few days of home bactrim)  --cx pending, monitor for results    Add ultrasound of the kidney, no previous ultrasound kidney despite having recurrent UTIs since age of 7 and history of recurrent renal calculi.  Will discontinue vancomycin, patient is nontoxic appearing and hemodynamically " stable.  Will continue with Merrem for now and obtain urine culture from the urgent care center      Significant bilat cervical and tonsillar LAD-pain  Hx of mononucleosis x 3 (last in spring 2018)  --associated with fever/chills and body pains  --mono spot in UTC yest negative, in ER today neg  --will ck EBV for poss reactivation.  --no respiratory compromise on admit   --got solumedrol 125 mg IV in ER due to abx reaction.     Fever, chills, n/v, abd pain, LBP, ? bilat flank pain  CXR with peribronchial thickening  --??etiology, viral vs mono reactivation, vs pyelo  --abx and monitor     Hx of occ marijauana use reported  Jewel nicotine use  --will add UDS to UA   --decline nicotine patch    Menstrual migraines  --reports takes OCP faithfully.  Shouldn't be on menses now but is.??    --HCG negative in ER  Patient is sexually active and also uses condoms.  She is aware that while taking antibiotics, her OCP may not be effective.    DVT prophylaxis:    Teds/seqs  Ambulate for now.      CODE STATUS:    Code Status and Medical Interventions:   Ordered at: 06/08/19 1841     Level Of Support Discussed With:    Patient     Code Status:    CPR     Medical Interventions (Level of Support Prior to Arrest):    Full       Admission Status:  I believe this patient meets OBSERVATION status, however if further evaluation or treatment plans warrant, status may change.  Based upon current information, I predict patient's care encounter to be less than or equal to 2 midnights.      Electronically signed by CONSTANCE Morocho, 06/08/19, 6:46 PM.        Brief Attending Admission Attestation     I have seen and examined the patient, performing an independent face-to-face diagnostic evaluation with plan of care reviewed and developed with the advanced practice clinician (APC).      Brief Summary Statement:   Shyann Dave is a 18 y.o. female past medical history significant for mononucleosis, prior H. pylori  infection, gastroparesis (data deficient-no history of diabetes), menstrual migraines-on OCP, recurrent renal calculi, recurrent UTI-has a supply of Bactrim DS on standby from her PCP, and GERD, who reported symptoms of urinary tract infection about 1 week ago and has taken Bactrim without any symptom relief.  She finally presented to the urgent care yesterday and was diagnosed with urinary tract infection.  Patient stated that her urine was very cloudy and concentrated.  She was given prescription for Bactrim and discharged home.  Today she presented to the emergency department due to worsening of her symptoms with associated lower back pain.  She also had fevers and chills.  T-max in the emergency department was 100.3.  Of note, patient is on her menstrual cycle right now, which is 2 weeks early.  Again patient is on OCP and is not supposed to have her period for another 2 weeks.  She is sexually active, but uses other form of birth control- condoms.  Other notable history is that patient reported that she passed kidney stones in the past, and she also reported that she has passed 2 of them yesterday prior to presenting to the urgent care center.  She has no abdominal pain specifically, but patient reported that she was having pain diffusely, especially tender lymph nodes in her neck.  She also reports reports some nausea without vomiting.  No diarrhea or constipation.  No chest pain or shortness of air.  No palpitations.    Work-up in the emergency department was unremarkable.  UA is abnormal however specimen appears to be contaminated.  Again and patient has been on antibiotics, so UA may be antibiotics modified.  Clinically patient has symptoms of UTI, similar to the past per patient report.  She was given Vanco and Merrem in the ED due to penicillin allergy.  She had allergic reaction to vancomycin, possibly red man syndrome and was treated with steroids and Benadryl, as well as Ativan.  She was also given  Toradol for pain.    Patient reported that her symptoms resolved after she was given medications for her allergic reaction.  Currently denies any pain or discomfort.    Remainder of detailed HPI is as noted above and has been reviewed and/or edited by me for completeness.      Attending Physical Exam:  General Assessment: No acute cardiopulmonary distress. Well developed and well nourished.    HEENT: NCAT, PERRL, MM moist; tender bilateral submandibular lymphadenopathy.  Patient also has a chronically nontender/enlarged posterior cervical lymph node, which had been present for many years after she was diagnosed with mononucleosis.  Oropharynx is relatively unremarkable, including the tonsils.    Neck: Supple    CVS: RRR, S1S2 normal, no murmurs    Resp: CTAB, no adventitious sound    Abd: soft, NT, ND, normal BS, no guarding or peritoneal signs, no CVA tenderness bilaterally    Ext: No edema, both calves are symmetric and NTTP    Neuro: Nonfocal    Skin: W/D/I. No rash.    Psych: Affect is appropriate    Brief Assessment/Plan :  See above for further detailed assessment and plan developed with APC which I have reviewed and/or edited for completeness.      Electronically signed by Earl Joaquin MD, 06/08/19, 8:15 PM.

## 2019-06-09 VITALS
TEMPERATURE: 97.7 F | DIASTOLIC BLOOD PRESSURE: 57 MMHG | OXYGEN SATURATION: 99 % | WEIGHT: 160 LBS | HEART RATE: 63 BPM | SYSTOLIC BLOOD PRESSURE: 119 MMHG | BODY MASS INDEX: 24.25 KG/M2 | RESPIRATION RATE: 16 BRPM | HEIGHT: 68 IN

## 2019-06-09 PROBLEM — R52 INTRACTABLE PAIN: Status: RESOLVED | Noted: 2019-06-08 | Resolved: 2019-06-09

## 2019-06-09 PROBLEM — R30.0 DYSURIA: Status: RESOLVED | Noted: 2019-06-08 | Resolved: 2019-06-09

## 2019-06-09 PROBLEM — M62.830 SPASM OF MUSCLE OF LOWER BACK: Status: RESOLVED | Noted: 2019-06-08 | Resolved: 2019-06-09

## 2019-06-09 PROBLEM — Z86.19 HISTORY OF HELICOBACTER PYLORI INFECTION: Status: RESOLVED | Noted: 2019-06-08 | Resolved: 2019-06-09

## 2019-06-09 PROBLEM — N39.0 ACUTE UTI (URINARY TRACT INFECTION): Status: ACTIVE | Noted: 2019-06-09

## 2019-06-09 PROBLEM — R50.9 FEVER: Status: RESOLVED | Noted: 2019-06-08 | Resolved: 2019-06-09

## 2019-06-09 LAB
ANION GAP SERPL CALCULATED.3IONS-SCNC: 10 MMOL/L
BACTERIA SPEC AEROBE CULT: NO GROWTH
BASOPHILS # BLD AUTO: 0 10*3/MM3 (ref 0–0.2)
BASOPHILS NFR BLD AUTO: 0 % (ref 0–1.5)
BUN BLD-MCNC: 7 MG/DL (ref 6–20)
BUN/CREAT SERPL: 13 (ref 7–25)
CALCIUM SPEC-SCNC: 8.6 MG/DL (ref 8.6–10.5)
CHLORIDE SERPL-SCNC: 109 MMOL/L (ref 98–107)
CO2 SERPL-SCNC: 20 MMOL/L (ref 22–29)
CREAT BLD-MCNC: 0.54 MG/DL (ref 0.57–1)
DEPRECATED RDW RBC AUTO: 50.2 FL (ref 37–54)
EOSINOPHIL # BLD AUTO: 0 10*3/MM3 (ref 0–0.4)
EOSINOPHIL NFR BLD AUTO: 0 % (ref 0.3–6.2)
ERYTHROCYTE [DISTWIDTH] IN BLOOD BY AUTOMATED COUNT: 14.5 % (ref 12.3–15.4)
GFR SERPL CREATININE-BSD FRML MDRD: 145 ML/MIN/1.73
GLUCOSE BLD-MCNC: 137 MG/DL (ref 65–99)
HCT VFR BLD AUTO: 36.8 % (ref 34–46.6)
HGB BLD-MCNC: 11.3 G/DL (ref 12–15.9)
IMM GRANULOCYTES # BLD AUTO: 0.04 10*3/MM3 (ref 0–0.05)
IMM GRANULOCYTES NFR BLD AUTO: 0.8 % (ref 0–0.5)
LYMPHOCYTES # BLD AUTO: 0.35 10*3/MM3 (ref 0.7–3.1)
LYMPHOCYTES NFR BLD AUTO: 6.9 % (ref 19.6–45.3)
MCH RBC QN AUTO: 28.5 PG (ref 26.6–33)
MCHC RBC AUTO-ENTMCNC: 30.7 G/DL (ref 31.5–35.7)
MCV RBC AUTO: 92.7 FL (ref 79–97)
MONOCYTES # BLD AUTO: 0.11 10*3/MM3 (ref 0.1–0.9)
MONOCYTES NFR BLD AUTO: 2.2 % (ref 5–12)
NEUTROPHILS # BLD AUTO: 4.57 10*3/MM3 (ref 1.7–7)
NEUTROPHILS NFR BLD AUTO: 90.1 % (ref 42.7–76)
NRBC BLD AUTO-RTO: 0 /100 WBC (ref 0–0.2)
PLATELET # BLD AUTO: 165 10*3/MM3 (ref 140–450)
PMV BLD AUTO: 9.8 FL (ref 6–12)
POTASSIUM BLD-SCNC: 4 MMOL/L (ref 3.5–5.2)
RBC # BLD AUTO: 3.97 10*6/MM3 (ref 3.77–5.28)
SODIUM BLD-SCNC: 139 MMOL/L (ref 136–145)
WBC NRBC COR # BLD: 5.07 10*3/MM3 (ref 3.4–10.8)

## 2019-06-09 PROCEDURE — 25010000002 KETOROLAC TROMETHAMINE PER 15 MG: Performed by: NURSE PRACTITIONER

## 2019-06-09 PROCEDURE — 96376 TX/PRO/DX INJ SAME DRUG ADON: CPT

## 2019-06-09 PROCEDURE — 99217 PR OBSERVATION CARE DISCHARGE MANAGEMENT: CPT | Performed by: FAMILY MEDICINE

## 2019-06-09 PROCEDURE — 80048 BASIC METABOLIC PNL TOTAL CA: CPT | Performed by: NURSE PRACTITIONER

## 2019-06-09 PROCEDURE — G0378 HOSPITAL OBSERVATION PER HR: HCPCS

## 2019-06-09 PROCEDURE — 96361 HYDRATE IV INFUSION ADD-ON: CPT

## 2019-06-09 PROCEDURE — 85025 COMPLETE CBC W/AUTO DIFF WBC: CPT | Performed by: NURSE PRACTITIONER

## 2019-06-09 PROCEDURE — 86665 EPSTEIN-BARR CAPSID VCA: CPT | Performed by: NURSE PRACTITIONER

## 2019-06-09 RX ORDER — HYDROXYZINE HYDROCHLORIDE 25 MG/1
25 TABLET, FILM COATED ORAL ONCE
Status: COMPLETED | OUTPATIENT
Start: 2019-06-09 | End: 2019-06-09

## 2019-06-09 RX ORDER — CHOLECALCIFEROL (VITAMIN D3) 125 MCG
5 CAPSULE ORAL ONCE
Status: COMPLETED | OUTPATIENT
Start: 2019-06-09 | End: 2019-06-09

## 2019-06-09 RX ADMIN — MELATONIN TAB 5 MG 5 MG: 5 TAB at 02:34

## 2019-06-09 RX ADMIN — NORETHINDRONE ACETATE AND ETHINYL ESTRADIOL 1 TABLET: 1; 5 TABLET ORAL at 08:56

## 2019-06-09 RX ADMIN — ACETAMINOPHEN 650 MG: 325 TABLET ORAL at 01:38

## 2019-06-09 RX ADMIN — FAMOTIDINE 40 MG: 20 TABLET, FILM COATED ORAL at 08:55

## 2019-06-09 RX ADMIN — SODIUM CHLORIDE 75 ML/HR: 9 INJECTION, SOLUTION INTRAVENOUS at 05:43

## 2019-06-09 RX ADMIN — KETOROLAC TROMETHAMINE 15 MG: 15 INJECTION, SOLUTION INTRAMUSCULAR; INTRAVENOUS at 12:07

## 2019-06-09 RX ADMIN — HYDROXYZINE HYDROCHLORIDE 25 MG: 25 TABLET, FILM COATED ORAL at 05:43

## 2019-06-09 NOTE — NURSING NOTE
"RN called into room. Pt sobbing. States, \"Can I leave? I just want to leave.  I don't do well not sleeping and I haven't sleep all night.  All those sleeping pills did were keep me awake. I finally fall asleep and then someone else comes in. That other girl said no one else would come in until 0800. APRN paged. Atarax given. RN recommends putting phone away as the pt has been on the phone all night. Pt states, \"I've been texting my mom.  She's in the [facility] and I've never been in the hospital without my mom.  Assisted to BR. Medicated. And lights turned out. Will ask staff to allow pt to sleep as long as possible.   "

## 2019-06-09 NOTE — PLAN OF CARE
Problem: Patient Care Overview  Goal: Plan of Care Review  Outcome: Outcome(s) achieved Date Met: 06/09/19 06/09/19 1340   Coping/Psychosocial   Plan of Care Reviewed With patient;friend   OTHER   Outcome Summary pain well managed with Toradol, pt. instructed to see primary md in 7-10 days, verbalized understanding.. Uti resolved per hospitalist. Left ambulatory did not wait for transport.

## 2019-06-09 NOTE — DISCHARGE SUMMARY
"    [facility] Medicine Services  DISCHARGE SUMMARY    Patient Name: Shyann Dave  : 2000  MRN: [id number]    Date of Admission: 2019  Date of Discharge:  19    Primary Care Physician: Provider, No Known      Hospital Course     Presenting Problem:   No admission diagnoses are documented for this encounter.    Active Hospital Problems    Diagnosis  POA   • Acute UTI (prior to admission, presents on day #5 of Bactrim) [N39.0]  Yes   • Posterior cervical lymphadenopathy [R59.0]  Yes   • History of mononucleosis [Z86.19]  Yes      Resolved Hospital Problems    Diagnosis Date Resolved POA   • **Intractable pain [R52] 2019 Yes   • Spasm of muscle of lower back [M62.830] 2019 Yes   • Fever [R50.9] 2019 Yes   • Dysuria [R30.0] 2019 Yes   • History of Helicobacter pylori infection [Z86.19] 2019 Yes          Hospital Course:  Shyann Dave is a 18 y.o. female with PMH of active swimmer with frequent related UTI's since approx 8 yo.  Her PCP had provider her with \"supply bottle\" of bactrim by her report to take as needed when onset of UTI occurred.  Pt with other hx of mononucleosis x 3 with last episode in Spring of 2018, menstrual migraines, H-pylori (just finished abx/tx in  of this year and followed by Dr TEENA Hernandez), gastroparesis, and frequent PNA.       Pt presented to ER with cc of dysuria and urgency x 5 days.  Started taking her bactrim on Monday, in interrim developed bilateral LAD to neck on approx Wednesday. Presented to ED when awoke with sudden worsening: fever/chills, worsening of dysuria with hematuria (although on her period early), and worsening neck/throat lymph node enlargement and associated pain.  Mono spot, influenza A/B, and strep negative, WBC of 10,  Temp of 100.3,  UA with lg blood and trace leuks, but no nitrates or bacteria.  Significant LBP/spasms and overall body pain reported.  CXR with peribronchial " thickening representing possible viral syndrome. CT a/p with mild changes of possible enteritis.  UCx no growth so far although she presumably likely did have UTI based on her history of symptoms and recurrent UTIs but suspect since being on Bactrim for 5 days has antibiotic modified her culture and is currently having good pathogen penetrance.    Patient was brought into the hospital for further observation, with supportive care her muscle spasms of the lower back and tenderness related to lymphadenopathy of the cervical chain improved.  She had no further documented fevers.  Patient in fact felt subjectively better the following hospital day, and has remained nontoxic and is appropriate for discharge home with probable viral syndrome.        Day of Discharge     HPI:   Vision eager for discharge.  She says she has more energy today, walking around the room and currently no evidence of pain or complaints.    ROS:  Otherwise ROS is negative except as mentioned in the HPI.    Vital Signs:   Temp:  [97.7 °F (36.5 °C)-100.3 °F (37.9 °C)] 97.7 °F (36.5 °C)  Heart Rate:  [63-96] 63  Resp:  [16-22] 16  BP: (104-134)/(56-95) 119/57     Physical Exam:  Constitutional: No acute distress, awake, alert, nontoxic, normal body habitus  Neck: cervical mild tender lymphadenopathy  Respiratory: Clear to auscultation bilaterally, good effort, nonlabored respirations   Cardiovascular: RRR, no murmur  Gastrointestinal: Positive bowel sounds, soft, nontender, nondistended  Musculoskeletal: No peripheral edema, normal muscle tone for age  Psychiatric: Appropriate affect, good insight and judgement, cooperative  Skin: No rashes, no jaundice, no petechiae, no mottling      Pertinent  and/or Most Recent Results     Results from last 7 days   Lab Units 06/09/19  0506 06/08/19  1358   WBC 10*3/mm3 5.07 10.31   HEMOGLOBIN g/dL 11.3* 12.5   HEMATOCRIT % 36.8 39.3   PLATELETS 10*3/mm3 165 169   SODIUM mmol/L 139 136   POTASSIUM mmol/L 4.0 3.5    CHLORIDE mmol/L 109* 100   CO2 mmol/L 20.0* 22.0   BUN mg/dL 7 7   CREATININE mg/dL 0.54* 0.85   GLUCOSE mg/dL 137* 90   CALCIUM mg/dL 8.6 9.0     Results from last 7 days   Lab Units 06/08/19  1358   BILIRUBIN mg/dL 0.4   ALK PHOS U/L 71   ALT (SGPT) U/L 7   AST (SGOT) U/L 15           Invalid input(s): TG, LDLCALC, LDLREALC      Brief Urine Lab Results  (Last result in the past 365 days)      Color   Clarity   Blood   Leuk Est   Nitrite   Protein   CREAT   Urine HCG        06/08/19 1401               Negative           Microbiology Results Abnormal     Procedure Component Value - Date/Time    Beta Strep Culture, Throat - Swab, Throat [[id number]]  (Normal) Collected:  06/08/19 1428    Lab Status:  Preliminary result Specimen:  Swab from Throat Updated:  06/09/19 1032     Throat Culture, Beta Strep No Beta Hemolytic Streptococcus Isolated    Narrative:       Group A Strep incidence is low in adults. Positive culture for Beta hemolytic Streptococcus species can reflect colonization and not true infection. Please correlate clinically.    Urine Culture - Urine, Urine, Clean Catch [[id number]]  (Normal) Collected:  06/08/19 1359    Lab Status:  Preliminary result Specimen:  Urine, Clean Catch Updated:  06/09/19 0751     Urine Culture No growth    Influenza Antigen, Rapid - Swab, Nasopharynx [[id number]]  (Normal) Collected:  06/08/19 1428    Lab Status:  Final result Specimen:  Swab from Nasopharynx Updated:  06/08/19 1547     Influenza A Ag, EIA Negative     Influenza B Ag, EIA Negative    Rapid Strep A Screen - Swab, Throat [[id number]]  (Normal) Collected:  06/08/19 1428    Lab Status:  Final result Specimen:  Swab from Throat Updated:  06/08/19 1541     Strep A Ag Negative    Narrative:       Test performed by Direct Antigen Testing.          Imaging Results (all)     Procedure Component Value Units Date/Time    CT Abdomen Pelvis With Contrast [[id number]] Collected:  06/08/19 1543     Updated:  06/09/19 1038     Narrative:       EXAMINATION: CT ABDOMEN/PELVIS W/CONTRAST - 06/08/2019      INDICATION: Dysuria, fever, right lower abdominal pain. Status post  appendectomy.      TECHNIQUE: 5 mm post-IV contrast portal venous phase and delayed venous  phase images through the abdomen and pelvis.     The radiation dose reduction device was turned on for each scan per the  ALARA (As Low as Reasonably Achievable) protocol.     COMPARISON: NONE     FINDINGS: History indicates bilateral flank pain, abdominal pain,  dysuria, history of kidney infection, right lower quadrant pain.  Previous appendectomy.     The included lower lungs appear grossly clear. There is mild fatty liver  change. Liver otherwise appears unremarkable. The spleen is not  enlarged. No significant abnormalities are seen of the gallbladder,  pancreas, adrenal glands, or kidneys. No upper abdominal free air,  ascites or adenopathy is seen. Small bowel loops are fluid filled but  nondistended proximally and appear decompressed distally. No abnormally  thickened bowel loops are seen. Small bowel distention is more apparent  on the CT scan  film with borderline dilated loops in the left  upper quadrant. The colon is relatively decompressed and appears to  contain mostly fluid. There is some formed stool only in the sigmoid and  rectum. Delayed images show contrast opacification of normal-caliber  collecting systems, ureters and bladder. Uterus and ovaries are not  enlarged. No asymmetric or abnormal enhancement is seen of the renal  parenchyma to suggest pancreatitis.       Impression:       Decompressed appearance of the colon, but no evidence of  inflammation to suggest colitis. Mildly prominent proximal small bowel  loops which are fluid-filled. Consider mild changes of enteritis.     DICTATED:   06/08/2019  EDITED/ls :   06/08/2019         This report was finalized on 6/9/2019 10:35 AM by DR. Dinesh Vargas MD.       XR Chest PA & Lateral [[id number]] Collected:   06/08/19 1508     Updated:  06/08/19 2250    Narrative:          EXAMINATION: XR CHEST, PA AND LATERAL - 06/08/2019     INDICATION: Fever, cough, dysuria and bodyaches.      COMPARISON: NONE     FINDINGS: The heart, mediastinum and pulmonary vasculature appear within  normal limits. Lungs appear well expanded and clear except for a  suggestion of mild peribronchial thickening.           Impression:       Mild peribronchial thickening, perhaps viral syndrome. No  other evidence of active chest disease.     DICTATED:   06/08/2019  EDITED/ls :   06/08/2019      This report was finalized on 6/8/2019 10:47 PM by DR. Dinesh Vargas MD.                             Order Current Status    Blood Culture - Blood, Arm, Left In process    Blood Culture - Blood, Arm, Left In process    [place] Virus VCA, IgM In process    Beta Strep Culture, Throat - Swab, Throat Preliminary result    Urine Culture - Urine, Urine, Clean Catch Preliminary result        Discharge Details        Discharge Medications      Continue These Medications      Instructions Start Date   NON FORMULARY   1 tablet, Oral, Daily, Microgynon 30--(birth control--pt from the [facility])       NON FORMULARY   5 mg, Oral, Daily, Aerius--(allergy med--pt from the [facility])       norethindrone-ethinyl estradiol 1-5 MG-MCG tablet  Commonly known as:  FEMHRT 1/5   1 tablet, Oral, Daily      sulfamethoxazole-trimethoprim 800-160 MG per tablet  Commonly known as:  BACTRIM DS,SEPTRA DS   1 tablet, Oral, 2 Times Daily             Allergies   Allergen Reactions   • [place] Flavor Anaphylaxis   • Morphine Anaphylaxis     Throat closes up   • Oxycodone Anaphylaxis     Throat closes up   • Penicillins Anaphylaxis   • Pineapple Anaphylaxis   • Augmentin [Amoxicillin-Pot Clavulanate] Unknown (See Comments)     Allergy test as a baby and was told not to take   • Hydrocodone Hives         Discharge Disposition:  Home or Self Care    Discharge Diet:  Diet Order   Procedures   • Diet  Regular       Discharge Activity:   As tolerates      CODE STATUS:    Code Status and Medical Interventions:   Ordered at: 06/08/19 1841     Level Of Support Discussed With:    Patient     Code Status:    CPR     Medical Interventions (Level of Support Prior to Arrest):    Full       No future appointments.          Time Spent on Discharge:  35 minutes    Electronically signed by Juana Hickey MD, 06/09/19, 12:27 PM.

## 2019-06-09 NOTE — PLAN OF CARE
Problem: Patient Care Overview  Goal: Plan of Care Review  Outcome: Ongoing (interventions implemented as appropriate)   06/09/19 0650   Coping/Psychosocial   Plan of Care Reviewed With patient   Plan of Care Review   Progress no change   OTHER   Outcome Summary VSS. Adequate I&O. Admitted for abd & back pain, lymph node swelling in throat & back. Toradol & Tylenol x1. c/o inability to sleep--Ambien, Melatonin, & Atarax. Was on phone most of night & didn't sleep much. States has never been in the hospital without mother and doesn't do well without sleep. Pt appears anxious.        Problem: Pain, Acute (Adult)  Goal: Identify Related Risk Factors and Signs and Symptoms  Outcome: Outcome(s) achieved Date Met: 06/09/19    Goal: Acceptable Pain Control/Comfort Level  Outcome: Ongoing (interventions implemented as appropriate)

## 2019-06-10 PROBLEM — N39.0 URINARY TRACT INFECTION WITHOUT HEMATURIA: Status: ACTIVE | Noted: 2019-06-10

## 2019-06-10 LAB
BACTERIA SPEC AEROBE CULT: NORMAL
EBV VCA IGM SER-ACNC: <36 U/ML (ref 0–35.9)

## 2019-06-13 LAB
BACTERIA SPEC AEROBE CULT: NORMAL
BACTERIA SPEC AEROBE CULT: NORMAL

## 2019-07-22 ENCOUNTER — APPOINTMENT (OUTPATIENT)
Dept: GENERAL RADIOLOGY | Facility: HOSPITAL | Age: 19
End: 2019-07-22

## 2019-07-22 ENCOUNTER — HOSPITAL ENCOUNTER (EMERGENCY)
Facility: HOSPITAL | Age: 19
Discharge: HOME | End: 2019-07-22
Attending: EMERGENCY MEDICINE | Admitting: EMERGENCY MEDICINE

## 2019-07-22 VITALS
DIASTOLIC BLOOD PRESSURE: 70 MMHG | HEIGHT: 69 IN | BODY MASS INDEX: 23.7 KG/M2 | HEART RATE: 75 BPM | RESPIRATION RATE: 18 BRPM | SYSTOLIC BLOOD PRESSURE: 136 MMHG | OXYGEN SATURATION: 100 % | WEIGHT: 160 LBS | TEMPERATURE: 98.7 F

## 2019-07-22 DIAGNOSIS — T14.8XXA BRUISING: ICD-10-CM

## 2019-07-22 DIAGNOSIS — S93.501A SPRAIN OF RIGHT GREAT TOE, INITIAL ENCOUNTER: Primary | ICD-10-CM

## 2019-07-22 DIAGNOSIS — M79.674 PAIN OF TOE OF RIGHT FOOT: ICD-10-CM

## 2019-07-22 PROCEDURE — 96372 THER/PROPH/DIAG INJ SC/IM: CPT

## 2019-07-22 PROCEDURE — 99283 EMERGENCY DEPT VISIT LOW MDM: CPT

## 2019-07-22 PROCEDURE — 25010000002 KETOROLAC TROMETHAMINE PER 15 MG: Performed by: NURSE PRACTITIONER

## 2019-07-22 PROCEDURE — 73630 X-RAY EXAM OF FOOT: CPT

## 2019-07-22 RX ORDER — ETODOLAC 200 MG/1
200 CAPSULE ORAL EVERY 8 HOURS
Qty: 12 CAPSULE | Refills: 0 | Status: SHIPPED | OUTPATIENT
Start: 2019-07-22 | End: 2019-09-10

## 2019-07-22 RX ORDER — KETOROLAC TROMETHAMINE 30 MG/ML
30 INJECTION, SOLUTION INTRAMUSCULAR; INTRAVENOUS ONCE
Status: COMPLETED | OUTPATIENT
Start: 2019-07-22 | End: 2019-07-22

## 2019-07-22 RX ADMIN — KETOROLAC TROMETHAMINE 30 MG: 30 INJECTION, SOLUTION INTRAMUSCULAR at 18:14

## 2019-08-25 ENCOUNTER — HOSPITAL ENCOUNTER (EMERGENCY)
Facility: HOSPITAL | Age: 19
Discharge: HOME | End: 2019-08-25
Attending: EMERGENCY MEDICINE | Admitting: EMERGENCY MEDICINE

## 2019-08-25 ENCOUNTER — APPOINTMENT (OUTPATIENT)
Dept: GENERAL RADIOLOGY | Facility: HOSPITAL | Age: 19
End: 2019-08-25

## 2019-08-25 VITALS
DIASTOLIC BLOOD PRESSURE: 60 MMHG | RESPIRATION RATE: 18 BRPM | HEART RATE: 95 BPM | SYSTOLIC BLOOD PRESSURE: 123 MMHG | TEMPERATURE: 98 F | WEIGHT: 150 LBS | BODY MASS INDEX: 22.73 KG/M2 | OXYGEN SATURATION: 98 % | HEIGHT: 68 IN

## 2019-08-25 DIAGNOSIS — S61.411A LACERATION OF RIGHT HAND, FOREIGN BODY PRESENCE UNSPECIFIED, INITIAL ENCOUNTER: ICD-10-CM

## 2019-08-25 DIAGNOSIS — S60.221A CONTUSION OF RIGHT HAND, INITIAL ENCOUNTER: Primary | ICD-10-CM

## 2019-08-25 PROCEDURE — 73130 X-RAY EXAM OF HAND: CPT

## 2019-08-25 PROCEDURE — 99284 EMERGENCY DEPT VISIT MOD MDM: CPT

## 2019-08-25 PROCEDURE — 99283 EMERGENCY DEPT VISIT LOW MDM: CPT

## 2019-08-25 RX ORDER — LIDOCAINE HYDROCHLORIDE 20 MG/ML
10 INJECTION, SOLUTION INFILTRATION; PERINEURAL ONCE
Status: COMPLETED | OUTPATIENT
Start: 2019-08-25 | End: 2019-08-25

## 2019-08-25 RX ADMIN — Medication 3 ML: at 09:36

## 2019-08-25 RX ADMIN — LIDOCAINE HYDROCHLORIDE 10 ML: 20 INJECTION, SOLUTION INFILTRATION; PERINEURAL at 11:22

## 2019-09-01 ENCOUNTER — HOSPITAL ENCOUNTER (EMERGENCY)
Facility: HOSPITAL | Age: 19
Discharge: HOME | End: 2019-09-01
Attending: EMERGENCY MEDICINE | Admitting: EMERGENCY MEDICINE

## 2019-09-01 VITALS
BODY MASS INDEX: 22.51 KG/M2 | OXYGEN SATURATION: 100 % | DIASTOLIC BLOOD PRESSURE: 60 MMHG | SYSTOLIC BLOOD PRESSURE: 115 MMHG | HEART RATE: 53 BPM | RESPIRATION RATE: 16 BRPM | TEMPERATURE: 98.5 F | HEIGHT: 69 IN | WEIGHT: 152 LBS

## 2019-09-01 DIAGNOSIS — Z51.89 ENCOUNTER FOR WOUND RE-CHECK: Primary | ICD-10-CM

## 2019-09-01 DIAGNOSIS — L08.9 WOUND INFECTION: ICD-10-CM

## 2019-09-01 DIAGNOSIS — T14.8XXA WOUND INFECTION: ICD-10-CM

## 2019-09-01 PROCEDURE — 87186 SC STD MICRODIL/AGAR DIL: CPT | Performed by: EMERGENCY MEDICINE

## 2019-09-01 PROCEDURE — 87205 SMEAR GRAM STAIN: CPT | Performed by: EMERGENCY MEDICINE

## 2019-09-01 PROCEDURE — 87070 CULTURE OTHR SPECIMN AEROBIC: CPT | Performed by: EMERGENCY MEDICINE

## 2019-09-01 PROCEDURE — 87147 CULTURE TYPE IMMUNOLOGIC: CPT | Performed by: EMERGENCY MEDICINE

## 2019-09-01 PROCEDURE — 99283 EMERGENCY DEPT VISIT LOW MDM: CPT

## 2019-09-01 RX ORDER — CEPHALEXIN 500 MG/1
500 CAPSULE ORAL 4 TIMES DAILY
Qty: 28 CAPSULE | Refills: 0 | Status: SHIPPED | OUTPATIENT
Start: 2019-09-01 | End: 2019-09-10

## 2019-09-01 NOTE — ED PROVIDER NOTES
Subjective   Ms. Shyann Dave is a 19 y.o. female who presents to the ED with c/o finger injury. On 8/25/19 she was seen here for laceration of the web space between fingers 1 and 2 on the right hand. She had stiches placed at that time. She reports this morning she woke up and the stiches had ripped out. The wound was producing green drainage. There are no other acute symptoms at this time.        History provided by:  Patient  Finger Injury   Location:  Right hand  Severity:  Moderate  Onset quality:  Sudden  Duration:  1 week  Timing:  Constant  Progression:  Partially resolved  Chronicity:  New  Relieved by:  Stiches  Worsened by:  Nothing      Review of Systems   Skin: Positive for wound.       Past Medical History:   Diagnosis Date   • Asthma    • Gastroparesis    • GERD (gastroesophageal reflux disease)    • H. pylori infection    • History of Helicobacter pylori infection 6/8/2019    Just finished tx Jan 2019.  Followed by Dr TEENA Hernandez    • History of mononucleosis     x 3 epsoides.  Last being spring 2018   • Menstrual migraine     hx   • Pneumonia    • Substance abuse (CMS/[facility])     Jewel nicotine use, and occ marijauna use    • Urinary tract infection        Allergies   Allergen Reactions   • [place] Flavor Anaphylaxis   • Morphine Anaphylaxis     Throat closes up   • Oxycodone Anaphylaxis     Throat closes up   • Penicillins Anaphylaxis   • Pineapple Anaphylaxis   • Augmentin [Amoxicillin-Pot Clavulanate] Unknown (See Comments)     Allergy test as a baby and was told not to take   • Hydrocodone Hives   • Bactroban [Mupirocin Calcium] Anxiety       Past Surgical History:   Procedure Laterality Date   • APPENDECTOMY     • SHOULDER SURGERY     • WISDOM TOOTH EXTRACTION         History reviewed. No pertinent family history.    Social History     Socioeconomic History   • Marital status: Single     Spouse name: Not on file   • Number of children: Not on file   • Years of education: Not on file   •  "Highest education level: Not on file   Tobacco Use   • Smoking status: Current Every Day Smoker     Packs/day: 0.50     Types: Electronic Cigarette   • Smokeless tobacco: Never Used   • Tobacco comment: smokes \"jewel 3% menthol nicotine\" daily    Substance and Sexual Activity   • Alcohol use: No   • Drug use: Yes     Types: Marijuana     Comment: rare, approx every 3 months with her mother    • Sexual activity: Defer     Birth control/protection: OCP   Social History Narrative    Single.  Lives along in her own home.  Active.  Swimmer.           Objective   Physical Exam   Constitutional: She is oriented to person, place, and time. She appears well-developed and well-nourished. No distress.   HENT:   Head: Normocephalic and atraumatic.   Nose: Nose normal.   Eyes: Conjunctivae are normal. No scleral icterus.   Neck: Normal range of motion. Neck supple.   Pulmonary/Chest: Effort normal. No respiratory distress.   Musculoskeletal: Normal range of motion.   Neurological: She is alert and oriented to person, place, and time.   Skin: Skin is warm and dry.   2 cm laceration of the web space between fingers 1 and 2. The majority of the wound is healed but there is an area approximately 0.5 cm which has opened.   Psychiatric: She has a normal mood and affect. Her behavior is normal.   Nursing note and vitals reviewed.      Procedures         ED Course  Final Diagnosis: as of Sep 05 1321   Encounter for wound re-check   Wound infection     No results found for this or any previous visit (from the past 24 hour(s)).  Note: In addition to lab results from this visit, the labs listed above may include labs taken at another facility or during a different encounter within the last 24 hours. Please correlate lab times with ED admission and discharge times for further clarification of the services performed during this visit.    No orders to display     Vitals:    09/01/19 1047   BP: 115/60   Pulse: 53   Resp: 16   Temp: 98.5 °F " "(36.9 °C)   SpO2: 100%   Weight: 68.9 kg (152 lb)   Height: 175.3 cm (69\")     Medications - No data to display  ECG/EMG Results (last 24 hours)     ** No results found for the last 24 hours. **        No orders to display                       MDM  Number of Diagnoses or Management Options  Encounter for wound re-check: new and requires workup  Wound infection: new and requires workup     Amount and/or Complexity of Data Reviewed  Discuss the patient with other providers: yes    Patient Progress  Patient progress: stable      Final diagnoses:   Encounter for wound re-check   Wound infection       Documentation assistance provided by kiesha Lugo.  Information recorded by the kiesha was done at my direction and has been verified and validated by me.     Antonio Lugo  09/01/19 1159       Maulik Walker PA  09/05/19 1321    "

## 2019-09-01 NOTE — DISCHARGE INSTRUCTIONS
Keep clean and dry.    Follow up with one of the [facility] Primary Care Providers below to setup primary care. If you need assistance coordinating a primary care appointment with a [facility] Primary Care Provider, please contact the Primary Care Coordinators at [phone] for appointment scheduling.    [facility], Primary Care   [address] , [address]   [place] [address] [phone]    [facility] Internal Medicine & Endocrinology  [address], [address]  [place] [address] [phone]    [facility] Family Medicine  [address], [address]   [place] [address] [phone]    [facility] Primary Care  [address], [address]  [place], [place] [id number] [phone]    [facility], Primary Care,   [address], [address]   [place], [place] [id number] [phone]    [facility] Primary Care  [address]  [place], [place] [id number] [phone]    [facility], Primary Care  [address], [address]  [place] [address] [phone]    [facility] Internal Medicine & Pediatrics  [address], [address]   [place] [address] [phone]    [facility], Primary Care  [address] [place] [address] [phone]      [facility] Primary Care  [address], [address]   [place] [address] [phone]    [facility] Family Medicine  [address] [place] [address] [phone]

## 2019-09-03 LAB
BACTERIA SPEC AEROBE CULT: ABNORMAL
GRAM STN SPEC: ABNORMAL
GRAM STN SPEC: ABNORMAL

## 2019-09-10 ENCOUNTER — APPOINTMENT (OUTPATIENT)
Dept: CT IMAGING | Facility: HOSPITAL | Age: 19
End: 2019-09-10

## 2019-09-10 ENCOUNTER — HOSPITAL ENCOUNTER (EMERGENCY)
Facility: HOSPITAL | Age: 19
Discharge: HOME | End: 2019-09-10
Attending: EMERGENCY MEDICINE | Admitting: EMERGENCY MEDICINE

## 2019-09-10 VITALS
HEIGHT: 69 IN | DIASTOLIC BLOOD PRESSURE: 62 MMHG | RESPIRATION RATE: 18 BRPM | HEART RATE: 62 BPM | WEIGHT: 145 LBS | TEMPERATURE: 99.4 F | OXYGEN SATURATION: 97 % | SYSTOLIC BLOOD PRESSURE: 115 MMHG | BODY MASS INDEX: 21.48 KG/M2

## 2019-09-10 DIAGNOSIS — S06.0X9A CONCUSSION WITH LOSS OF CONSCIOUSNESS, INITIAL ENCOUNTER: Primary | ICD-10-CM
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
## 2019-09-10 LAB
AMPHET+METHAMPHET UR QL: POSITIVE
AMPHETAMINES UR QL: NEGATIVE
B-HCG UR QL: NEGATIVE
BARBITURATES UR QL SCN: NEGATIVE
BENZODIAZ UR QL SCN: NEGATIVE
BUPRENORPHINE SERPL-MCNC: NEGATIVE NG/ML
CANNABINOIDS SERPL QL: POSITIVE
COCAINE UR QL: NEGATIVE
INTERNAL NEGATIVE CONTROL: NEGATIVE
INTERNAL POSITIVE CONTROL: POSITIVE
Lab: NORMAL
METHADONE UR QL SCN: NEGATIVE
OPIATES UR QL: NEGATIVE
OXYCODONE UR QL SCN: NEGATIVE
PCP UR QL SCN: NEGATIVE
PROPOXYPH UR QL: NEGATIVE
TRICYCLICS UR QL SCN: NEGATIVE

## 2019-09-10 PROCEDURE — 80306 DRUG TEST PRSMV INSTRMNT: CPT | Performed by: EMERGENCY MEDICINE

## 2019-09-10 PROCEDURE — 70450 CT HEAD/BRAIN W/O DYE: CPT

## 2019-09-10 PROCEDURE — 99283 EMERGENCY DEPT VISIT LOW MDM: CPT

## 2019-09-10 PROCEDURE — 81025 URINE PREGNANCY TEST: CPT | Performed by: EMERGENCY MEDICINE

## 2019-09-10 RX ORDER — IBUPROFEN 600 MG/1
600 TABLET ORAL ONCE
Status: COMPLETED | OUTPATIENT
Start: 2019-09-10 | End: 2019-09-10

## 2019-09-10 RX ORDER — DEXTROAMPHETAMINE SACCHARATE, AMPHETAMINE ASPARTATE, DEXTROAMPHETAMINE SULFATE AND AMPHETAMINE SULFATE 5; 5; 5; 5 MG/1; MG/1; MG/1; MG/1
20 TABLET ORAL DAILY
COMMUNITY
End: 2020-05-30

## 2019-09-10 RX ADMIN — IBUPROFEN 600 MG: 600 TABLET, FILM COATED ORAL at 20:36
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
## 2019-09-11 NOTE — DISCHARGE INSTRUCTIONS
Take Tylenol or ibuprofen as needed to help with headache and knee pain secondary to trauma.    Follow-up with primary care physician for recheck in 1 week.      Avoid any kind of trauma or injury to the head for at least 1 week after complete resolution of symptoms.

## 2020-05-30 ENCOUNTER — APPOINTMENT (OUTPATIENT)
Dept: GENERAL RADIOLOGY | Facility: HOSPITAL | Age: 20
End: 2020-05-30

## 2020-05-30 ENCOUNTER — HOSPITAL ENCOUNTER (EMERGENCY)
Facility: HOSPITAL | Age: 20
Discharge: HOME | End: 2020-05-30
Attending: EMERGENCY MEDICINE | Admitting: EMERGENCY MEDICINE

## 2020-05-30 VITALS
OXYGEN SATURATION: 100 % | SYSTOLIC BLOOD PRESSURE: 118 MMHG | DIASTOLIC BLOOD PRESSURE: 76 MMHG | WEIGHT: 144 LBS | RESPIRATION RATE: 20 BRPM | HEART RATE: 86 BPM | TEMPERATURE: 98.4 F | BODY MASS INDEX: 21.33 KG/M2 | HEIGHT: 69 IN

## 2020-05-30 DIAGNOSIS — S93.402A SPRAIN OF LEFT ANKLE, UNSPECIFIED LIGAMENT, INITIAL ENCOUNTER: Primary | ICD-10-CM

## 2020-05-30 PROCEDURE — 99283 EMERGENCY DEPT VISIT LOW MDM: CPT

## 2020-05-30 PROCEDURE — 73610 X-RAY EXAM OF ANKLE: CPT

## 2020-05-30 RX ORDER — LORAZEPAM 1 MG/1
1 TABLET ORAL 2 TIMES DAILY PRN
COMMUNITY
End: 2020-12-28

## 2020-05-30 RX ORDER — IBUPROFEN 600 MG/1
600 TABLET ORAL ONCE
Status: COMPLETED | OUTPATIENT
Start: 2020-05-30 | End: 2020-05-30

## 2020-05-30 RX ADMIN — IBUPROFEN 600 MG: 600 TABLET, FILM COATED ORAL at 18:11

## 2020-12-12 ENCOUNTER — HOSPITAL ENCOUNTER (EMERGENCY)
Facility: HOSPITAL | Age: 20
Discharge: HOME | End: 2020-12-12
Attending: EMERGENCY MEDICINE | Admitting: EMERGENCY MEDICINE

## 2020-12-12 VITALS
TEMPERATURE: 97.3 F | DIASTOLIC BLOOD PRESSURE: 69 MMHG | SYSTOLIC BLOOD PRESSURE: 121 MMHG | BODY MASS INDEX: 21.52 KG/M2 | HEART RATE: 55 BPM | OXYGEN SATURATION: 100 % | HEIGHT: 68 IN | WEIGHT: 142 LBS | RESPIRATION RATE: 18 BRPM

## 2020-12-12 DIAGNOSIS — Z20.2 POSSIBLE EXPOSURE TO STD: Primary | ICD-10-CM

## 2020-12-12 DIAGNOSIS — Z34.91 FIRST TRIMESTER PREGNANCY: ICD-10-CM

## 2020-12-12 LAB
B-HCG UR QL: POSITIVE
BACTERIA UR QL AUTO: ABNORMAL /HPF
BILIRUB UR QL STRIP: NEGATIVE
CLARITY UR: CLEAR
COLOR UR: YELLOW
GLUCOSE UR STRIP-MCNC: NEGATIVE MG/DL
HCG INTACT+B SERPL-ACNC: 1494 MIU/ML
HGB UR QL STRIP.AUTO: NEGATIVE
HYALINE CASTS UR QL AUTO: ABNORMAL /LPF
INTERNAL NEGATIVE CONTROL: NEGATIVE
INTERNAL POSITIVE CONTROL: POSITIVE
KETONES UR QL STRIP: NEGATIVE
LEUKOCYTE ESTERASE UR QL STRIP.AUTO: ABNORMAL
Lab: ABNORMAL
NITRITE UR QL STRIP: NEGATIVE
PH UR STRIP.AUTO: 5.5 [PH] (ref 5–8)
PROT UR QL STRIP: NEGATIVE
RBC # UR: ABNORMAL /HPF
REF LAB TEST METHOD: ABNORMAL
SP GR UR STRIP: 1.01 (ref 1–1.03)
SQUAMOUS #/AREA URNS HPF: ABNORMAL /HPF
UROBILINOGEN UR QL STRIP: ABNORMAL
WBC UR QL AUTO: ABNORMAL /HPF

## 2020-12-12 PROCEDURE — 96374 THER/PROPH/DIAG INJ IV PUSH: CPT

## 2020-12-12 PROCEDURE — 99283 EMERGENCY DEPT VISIT LOW MDM: CPT

## 2020-12-12 PROCEDURE — 87086 URINE CULTURE/COLONY COUNT: CPT

## 2020-12-12 PROCEDURE — 87491 CHLMYD TRACH DNA AMP PROBE: CPT

## 2020-12-12 PROCEDURE — 25010000002 CEFTRIAXONE PER 250 MG: Performed by: PHYSICIAN ASSISTANT

## 2020-12-12 PROCEDURE — 81025 URINE PREGNANCY TEST: CPT | Performed by: EMERGENCY MEDICINE

## 2020-12-12 PROCEDURE — 87591 N.GONORRHOEAE DNA AMP PROB: CPT

## 2020-12-12 PROCEDURE — 87529 HSV DNA AMP PROBE: CPT | Performed by: PHYSICIAN ASSISTANT

## 2020-12-12 PROCEDURE — 96372 THER/PROPH/DIAG INJ SC/IM: CPT

## 2020-12-12 PROCEDURE — 84702 CHORIONIC GONADOTROPIN TEST: CPT | Performed by: PHYSICIAN ASSISTANT

## 2020-12-12 PROCEDURE — 81001 URINALYSIS AUTO W/SCOPE: CPT

## 2020-12-12 RX ORDER — AZITHROMYCIN 250 MG/1
1000 TABLET, FILM COATED ORAL ONCE
Status: COMPLETED | OUTPATIENT
Start: 2020-12-12 | End: 2020-12-12

## 2020-12-12 RX ORDER — ONDANSETRON 4 MG/1
4 TABLET, FILM COATED ORAL EVERY 8 HOURS PRN
Qty: 20 TABLET | Refills: 0 | Status: SHIPPED | OUTPATIENT
Start: 2020-12-12 | End: 2020-12-28

## 2020-12-12 RX ORDER — METRONIDAZOLE 500 MG/1
2000 TABLET ORAL ONCE
Status: COMPLETED | OUTPATIENT
Start: 2020-12-12 | End: 2020-12-12

## 2020-12-12 RX ADMIN — METRONIDAZOLE 2000 MG: 500 TABLET ORAL at 09:15

## 2020-12-12 RX ADMIN — AZITHROMYCIN MONOHYDRATE 1000 MG: 250 TABLET ORAL at 09:15

## 2020-12-12 RX ADMIN — LIDOCAINE HYDROCHLORIDE 250 MG: 10 INJECTION, SOLUTION EPIDURAL; INFILTRATION; INTRACAUDAL; PERINEURAL at 09:15

## 2020-12-12 NOTE — ED PROVIDER NOTES
EMERGENCY DEPARTMENT ENCOUNTER    Room Number:  06/06  Date of encounter:  12/12/2020  PCP: Provider, No Known  Historian: Patient      HPI:  Chief Complaint: STD check        Context: Shaynn Dave is a 20 y.o. female who presents to the ED c/o vaginal itching and some white vaginal discharge for the past 4 days.  No fevers chills or sweats.  No flank pain or abdominal pain.  No dysuria.  No lesions to the vulva or genital area.  Unsure of her last menstrual period thinks it may have been early November.  Just prior to that she had a miscarriage although she did not know she was pregnant did not take a pregnancy test, states she had a heavy period with some tissue.    She denies alcohol use, but admits to marijuana use, she vapes regularly.      PAST MEDICAL HISTORY  Active Ambulatory Problems     Diagnosis Date Noted   • Posterior cervical lymphadenopathy 06/08/2019   • History of mononucleosis 06/08/2019   • Acute UTI (prior to admission, presents on day #5 of Bactrim) 06/09/2019   • Urinary tract infection without hematuria 06/10/2019     Resolved Ambulatory Problems     Diagnosis Date Noted   • Intractable pain 06/08/2019   • Spasm of muscle of lower back 06/08/2019   • Fever 06/08/2019   • Dysuria 06/08/2019   • History of Helicobacter pylori infection 06/08/2019     Past Medical History:   Diagnosis Date   • Asthma    • Gastroparesis    • GERD (gastroesophageal reflux disease)    • H. pylori infection    • Menstrual migraine    • Pneumonia    • Substance abuse (CMS/[facility])    • Urinary tract infection          PAST SURGICAL HISTORY  Past Surgical History:   Procedure Laterality Date   • APPENDECTOMY     • SHOULDER SURGERY     • WISDOM TOOTH EXTRACTION           FAMILY HISTORY  History reviewed. No pertinent family history.      SOCIAL HISTORY  Social History     Socioeconomic History   • Marital status: Single     Spouse name: Not on file   • Number of children: Not on file   • Years of education:  "Not on file   • Highest education level: Not on file   Tobacco Use   • Smoking status: Current Every Day Smoker     Packs/day: 0.50     Types: Electronic Cigarette   • Smokeless tobacco: Never Used   • Tobacco comment: smokes \"jewel 3% menthol nicotine\" daily    Substance and Sexual Activity   • Alcohol use: No   • Drug use: Yes     Types: Marijuana   • Sexual activity: Yes   Social History Narrative    Single.  Lives along in her own home.  Active.  Swimmer.           ALLERGIES  Darren flavor, Morphine, Oxycodone, Penicillins, Pineapple, Augmentin [amoxicillin-pot clavulanate], Hydrocodone, and Bactroban [mupirocin calcium]        REVIEW OF SYSTEMS  Review of Systems     All systems reviewed and negative except for those discussed in HPI.       PHYSICAL EXAM    I have reviewed the triage vital signs and nursing notes.    ED Triage Vitals [12/12/20 0718]   Temp Heart Rate Resp BP SpO2   97.3 °F (36.3 °C) 53 18 114/62 100 %      Temp src Heart Rate Source Patient Position BP Location FiO2 (%)   Infrared Monitor Sitting Left arm --       Physical Exam  GENERAL:   Not toxic appearing afebrile.  Hemodynamically stable.  HENT: Nares patent.  EYES: No scleral icterus.  CV: Regular rhythm, regular rate.  RESPIRATORY: Normal effort.  No audible wheezes, rales or rhonchi.  ABDOMEN: Soft, nontender.  No guarding or rebound tenderness.  Bowel sounds are normal.  MUSCULOSKELETAL: No deformities.   NEURO: Alert, moves all extremities, follows commands.  SKIN: Warm, dry, no rash visualized.        LAB RESULTS  Recent Results (from the past 24 hour(s))   Urinalysis With Culture If Indicated - Urine, Clean Catch    Collection Time: 12/12/20  7:28 AM    Specimen: Urine, Clean Catch   Result Value Ref Range    Color, UA Yellow Yellow, Straw    Appearance, UA Clear Clear    pH, UA 5.5 5.0 - 8.0    Specific Gravity, UA 1.007 1.001 - 1.030    Glucose, UA Negative Negative    Ketones, UA Negative Negative    Bilirubin, UA Negative " Negative    Blood, UA Negative Negative    Protein, UA Negative Negative    Leuk Esterase, UA Small (1+) (A) Negative    Nitrite, UA Negative Negative    Urobilinogen, UA 0.2 E.U./dL 0.2 - 1.0 E.U./dL   Urinalysis, Microscopic Only - Urine, Clean Catch    Collection Time: 12/12/20  7:28 AM    Specimen: Urine, Clean Catch   Result Value Ref Range    RBC, UA 0-2 None Seen, 0-2 /HPF    WBC, UA 6-12 (A) None Seen, 0-2 /HPF    Bacteria, UA None Seen None Seen, Trace /HPF    Squamous Epithelial Cells, UA 0-2 None Seen, 0-2 /HPF    Hyaline Casts, UA None Seen 0 - 6 /LPF    Methodology Automated Microscopy    POCT Pregnancy, Urine    Collection Time: 12/12/20  7:31 AM    Specimen: Urine   Result Value Ref Range    HCG, Urine, QL Positive (A) Negative    Lot Number [id number]     Internal Positive Control Positive     Internal Negative Control Negative    hCG, Quantitative, Pregnancy    Collection Time: 12/12/20  8:28 AM    Specimen: Blood   Result Value Ref Range    HCG Quantitative 1,494.00 mIU/mL       If labs were ordered, I independently reviewed the results.        RADIOLOGY  No Radiology Exams Resulted Within Past 24 Hours          PROCEDURES    Procedures    No orders to display       MEDICATIONS GIVEN IN ER    Medications   azithromycin (ZITHROMAX) tablet 1,000 mg (1,000 mg Oral Given 12/12/20 0915)   cefTRIAXone (ROCEPHIN) 250 mg/ml in lidocaine 1% IM syringe (250 mg vial) (250 mg Intramuscular Given 12/12/20 0915)   metroNIDAZOLE (FLAGYL) tablet 2,000 mg (2,000 mg Oral Given 12/12/20 0915)         PROGRESS, DATA ANALYSIS, CONSULTS, AND MEDICAL DECISION MAKING    All labs have been independently reviewed by me.  All radiology studies have been reviewed by me and the radiologist dictating the report.   EKG's have been independently viewed and interpreted by me.                 GC and Chlamydia tests pending, patient requests testing for herpes although she is not not symptomatic.  Prefers to be treated empirically  today for GC chlamydia and trichomoniasis.  Beta hCG is consistent with a 5-week pregnancy.  We will refer to OB/GYN for further evaluation.  Safe sex information given.  Advised to return if she has any change or worsening of symptoms.  She verbalizes understanding and is agreeable to plan.      AS OF 09:17 EST VITALS:    BP - 121/69  HR - 55  TEMP - 97.3 °F (36.3 °C) (Infrared)  O2 SATS - 100%        DIAGNOSIS  Final diagnoses:   Possible exposure to STD   First trimester pregnancy         DISPOSITION  DISCHARGE    Patient discharged in stable condition.    Reviewed implications of results, diagnosis, meds, responsibility to follow up, warning signs and symptoms of possible worsening, potential complications and reasons to return to ER.    Patient/Family voiced understanding of above instructions.    Discussed plan for discharge, as there is no emergent indication for admission.  Pt/family is agreeable and understands need for follow up and possible repeat testing.  Pt/family is aware that discharge does not mean that nothing is wrong but that it indicates no emergency is currently present that requires admission and they must continue care with follow-up as given below or with a physician of their choice.     FOLLOW-UP  Erik Mendoza MD  [address] [facility] [address] [place], [place] [id number] [phone]    Schedule an appointment as soon as possible for a visit   For establishment of OB provider.         Medication List      New Prescriptions    ondansetron 4 MG tablet  Commonly known as: Zofran  Take 1 tablet by mouth Every 8 (Eight) Hours As Needed for Nausea or Vomiting.           Where to Get Your Medications      You can get these medications from any pharmacy    Bring a paper prescription for each of these medications  · ondansetron 4 MG tablet                  Aaron Bergeron PA-C  12/12/20 5958

## 2020-12-13 LAB — BACTERIA SPEC AEROBE CULT: ABNORMAL

## 2020-12-15 ENCOUNTER — TELEPHONE (OUTPATIENT)
Dept: EMERGENCY DEPT | Facility: HOSPITAL | Age: 20
End: 2020-12-15

## 2020-12-15 LAB
C TRACH RRNA SPEC QL NAA+PROBE: POSITIVE
N GONORRHOEA RRNA SPEC QL NAA+PROBE: NEGATIVE

## 2020-12-15 NOTE — TELEPHONE ENCOUNTER
I spoke with the patient and advised of her findings and treatment recommendations.  All questions answered.

## 2020-12-16 LAB
HSV1 DNA SPEC QL NAA+PROBE: NEGATIVE
HSV2 DNA SPEC QL NAA+PROBE: NEGATIVE

## 2020-12-17 ENCOUNTER — TELEPHONE (OUTPATIENT)
Dept: OBSTETRICS AND GYNECOLOGY | Facility: CLINIC | Age: 20
End: 2020-12-17

## 2020-12-17 NOTE — TELEPHONE ENCOUNTER
ER eval 12/21 at [facility]. She was treated for UTI and Chlamydia. Hcg 1494. She is complaining of numbness in her hands and both legs. The  numbness started on 3 fingers when she went to the ER and now its the whole hand. She states she is RH neg, G1 but denies any vaginal bleeding. HSV testing was negative in the ER. I will talk to Dr. Mendoza and call her back

## 2020-12-17 NOTE — TELEPHONE ENCOUNTER
Patient called and scheduled a new ob apt for the 28 because that's when shell be 8 weeks, but she said she's having pain and wanted to talk to a nurse about coming in sooner if needed

## 2020-12-17 NOTE — TELEPHONE ENCOUNTER
Dr. Mendoza reviewed her hcg and thinks she should keep the apt she had scheduled for now and go get eval with urgent care or PCP. She said she is not from [place] and is going to school here. I gave her [facility] of [facility] and [facility], [facility] as well. Take a good PNV with DHA and good hydration and watch avoid injury with the numbness and weakness in her hands and legs. She was at the gym when I called her back and she is feeling better, so it may be circulation.

## 2020-12-28 ENCOUNTER — INITIAL PRENATAL (OUTPATIENT)
Dept: OBSTETRICS AND GYNECOLOGY | Facility: CLINIC | Age: 20
End: 2020-12-28

## 2020-12-28 VITALS — BODY MASS INDEX: 22.2 KG/M2 | WEIGHT: 146 LBS | SYSTOLIC BLOOD PRESSURE: 110 MMHG | DIASTOLIC BLOOD PRESSURE: 64 MMHG

## 2020-12-28 DIAGNOSIS — Z34.91 FIRST TRIMESTER PREGNANCY: ICD-10-CM

## 2020-12-28 DIAGNOSIS — F12.10 MILD TETRAHYDROCANNABINOL (THC) ABUSE: Primary | ICD-10-CM

## 2020-12-28 PROBLEM — N39.0 ACUTE UTI (URINARY TRACT INFECTION): Status: RESOLVED | Noted: 2019-06-09 | Resolved: 2020-12-28

## 2020-12-28 PROBLEM — F41.9 ANXIETY: Status: ACTIVE | Noted: 2020-12-28

## 2020-12-28 PROBLEM — Z34.90 PREGNANCY: Status: ACTIVE | Noted: 2020-12-28

## 2020-12-28 PROBLEM — R59.0 POSTERIOR CERVICAL LYMPHADENOPATHY: Status: RESOLVED | Noted: 2019-06-08 | Resolved: 2020-12-28

## 2020-12-28 PROBLEM — N39.0 URINARY TRACT INFECTION WITHOUT HEMATURIA: Status: RESOLVED | Noted: 2019-06-10 | Resolved: 2020-12-28

## 2020-12-28 PROCEDURE — 76817 TRANSVAGINAL US OBSTETRIC: CPT | Performed by: OBSTETRICS & GYNECOLOGY

## 2020-12-28 PROCEDURE — 0501F PRENATAL FLOW SHEET: CPT | Performed by: OBSTETRICS & GYNECOLOGY

## 2020-12-28 NOTE — PROGRESS NOTES
Initial ob visit     CC- Here for care of pregnancy        Shyann Dave is a 20 y.o. female, , who presents for her first obstetrical visit.  Her last LMP was No LMP recorded (lmp unknown). Patient is pregnant..    OB History    Para Term  AB Living   1             SAB TAB Ectopic Molar Multiple Live Births                    # Outcome Date GA Lbr Edgardo/2nd Weight Sex Delivery Anes PTL Lv   1 Current                Initial positive test date : 11/10/2020, UPT          Prior obstetric issues, potential pregnancy concerns: N/A first pregnancy  Family history of genetic issues (includes FOB): Pt. Adopted. FOB has mental health problems in his family  Prior infections concerning in pregnancy (Rash, fever in last 2 weeks): none  Varicella Hx - vaccine completed   Prior testing for Cystic Fibrosis Carrier or Sickle Cell Trait- thinks she was and thinks it was negative.   Prepregnancy BMI - Body mass index is 22.2 kg/m².  History of STD: yes GC/CHLAMYDIA  Ultrasound Today: Yes.  Findings showed single viable IUP.  I have personally evaluated the U/S and agree with the findings. Felisha Mendoza MD    Additional Pertinent History   Last Pap :   Last Completed Pap Smear       Status Date      PAP SMEAR No completions recorded        History of abnormal Pap smear: no  Family history of uterine, colon, breast, or ovarian cancer: unknonwn   Feelings of Anxiety or Depression: yes - anxiety, was previously given hydroxyzine but states she did not take it due to the pregnancy  Tobacco Usage?: Yes Shyann Dave  reports that she has been smoking electronic cigarette. She has been smoking about 0.50 packs per day. She has never used smokeless tobacco.. I have educated her on the risk of diseases from using tobacco products such as cancer, COPD and heart disease.     I advised her to quit and she is not willing to quit.    I spent 3  minutes counseling the patient.        Alcohol/Drug  Use?: yes, THC, every other day  Over the age of 35 at delivery: no  Desires Genetic Screening: unsure  Flu Status: Declines    PMH  Past Medical History:   Diagnosis Date   • Asthma    • Gastroparesis    • GERD (gastroesophageal reflux disease)    • H. pylori infection    • History of Helicobacter pylori infection 6/8/2019   • History of mononucleosis    • Menstrual migraine    • Pneumonia    • Substance abuse (CMS/HCC)    • Urinary tract infection        Current Outpatient Medications:   •  Prenatal Vit-Fe Fumarate-FA (PRENATAL VITAMIN PO), Take  by mouth., Disp: , Rfl:     The additional following portions of the patient's history were reviewed and updated as appropriate: allergies, current medications, past family history, past medical history, past social history, past surgical history and problem list.    Review of Systems   Review of Systems  Current obstetric complaints : rectal bleeding with every BM's, reports 1-2 times it was bleeding more than usual.  She also reports delayed numbness in her hands and feet. States she sometimes can't feel her hands at all in the last 2 weeks. Holding a pen is challenge for her.   All systems reviewed and otherwise normal.    I have reviewed and agree with the HPI, ROS, and historical information as entered above. Felisha Mendoza MD    /64   Wt 66.2 kg (146 lb)   LMP  (LMP Unknown)   BMI 22.20 kg/m²     Physical Exam  General:  well developed; well nourished  no acute distress   Chest/Respiratory: No labored breathing, normal respiratory effort, normal appearance, no respiratory noises noted   Heart:  normal rate, regular rhythm,  no murmurs, rubs, or gallops   Thyroid: normal to inspection and palpation   Breasts:  Not performed.   Abdomen: soft, non-tender; no masses  no umbilical or inguinal hernias are present  no hepato-splenomegaly   Pelvis: Clinical staff was present for exam  External genitalia:  normal appearance of the external genitalia including  [place]'s and [place]'s glands.  :  urethral meatus normal;  Vaginal:  normal pink mucosa without prolapse or lesions.  Cervix:  normal appearance.  Uterus:  normal size, shape and consistency.  Adnexa:  normal bimanual exam of the adnexa.        Assessment and Plan    Problem List Items Addressed This Visit        Other    Mild tetrahydrocannabinol (THC) abuse - Primary    Overview     Advise cessation during pregnancy.           Other Visit Diagnoses     First trimester pregnancy        Relevant Orders    Obstetric Panel    HIV-1 / O / 2 Ag / Antibody 4th Generation    Urine Culture - Urine, Urine, Clean Catch    Urine Drug Screen - Urine, Clean Catch    Pap IG, Ct-Ng, Rfx HPV ASCU          1. Pregnancy at 6w5d  2. Reviewed routine prenatal care with the office and educational materials given  3. Lab(s) Ordered  4. Discussed options for genetic testing including first trimester nuchal translucency screen, genetic disease carrier testing, quadruple screen, and [place].  5. Discontinue the use of all non-medicinal drugs and chemicals  6. Patient is on Prenatal vitamins  7. Activity recommendation : 150 minutes/week of moderate intensity aerobic activity unless we limit for bleeding, hypertension or other pregnancy complication   8. Patient moving to [place].  She is traveling tomorrow to find a place.  She is unsure if she will be back for her second visit.  We will schedule in 5 weeks just in case.  She was advised to sign up for my chart so she could access her labs that were drawn today for her new provider.  Return in about 5 weeks (around 2/1/2021).      Felisha Mendoza MD  12/28/2020

## 2020-12-30 LAB
ABO GROUP BLD: ABNORMAL
AMPHETAMINES UR QL SCN: NEGATIVE NG/ML
BACTERIA UR CULT: NO GROWTH
BACTERIA UR CULT: NORMAL
BARBITURATES UR QL SCN: NEGATIVE NG/ML
BASOPHILS # BLD AUTO: 0 X10E3/UL (ref 0–0.2)
BASOPHILS NFR BLD AUTO: 0 %
BENZODIAZ UR QL SCN: NEGATIVE NG/ML
BLD GP AB SCN SERPL QL: NEGATIVE
BZE UR QL SCN: NEGATIVE NG/ML
CANNABINOIDS UR QL SCN: POSITIVE NG/ML
CREAT UR-MCNC: 270.4 MG/DL (ref 20–300)
EOSINOPHIL # BLD AUTO: 0 X10E3/UL (ref 0–0.4)
EOSINOPHIL NFR BLD AUTO: 0 %
ERYTHROCYTE [DISTWIDTH] IN BLOOD BY AUTOMATED COUNT: 13.4 % (ref 11.7–15.4)
HBV SURFACE AG SERPL QL IA: NEGATIVE
HCT VFR BLD AUTO: 36.4 % (ref 34–46.6)
HCV AB S/CO SERPL IA: <0.1 S/CO RATIO (ref 0–0.9)
HGB BLD-MCNC: 12.4 G/DL (ref 11.1–15.9)
HIV 1+2 AB+HIV1 P24 AG SERPL QL IA: NON REACTIVE
IMM GRANULOCYTES # BLD AUTO: 0 X10E3/UL (ref 0–0.1)
IMM GRANULOCYTES NFR BLD AUTO: 1 %
LABORATORY COMMENT REPORT: ABNORMAL
LYMPHOCYTES # BLD AUTO: 1.1 X10E3/UL (ref 0.7–3.1)
LYMPHOCYTES NFR BLD AUTO: 24 %
MCH RBC QN AUTO: 34.8 PG (ref 26.6–33)
MCHC RBC AUTO-ENTMCNC: 34.1 G/DL (ref 31.5–35.7)
MCV RBC AUTO: 102 FL (ref 79–97)
METHADONE UR QL SCN: NEGATIVE NG/ML
MONOCYTES # BLD AUTO: 0.5 X10E3/UL (ref 0.1–0.9)
MONOCYTES NFR BLD AUTO: 12 %
NEUTROPHILS # BLD AUTO: 2.8 X10E3/UL (ref 1.4–7)
NEUTROPHILS NFR BLD AUTO: 63 %
OPIATES UR QL SCN: NEGATIVE NG/ML
OXYCODONE+OXYMORPHONE UR QL SCN: NEGATIVE NG/ML
PCP UR QL: NEGATIVE NG/ML
PH UR: 5.9 [PH] (ref 4.5–8.9)
PLATELET # BLD AUTO: 213 X10E3/UL (ref 150–450)
PROPOXYPH UR QL SCN: NEGATIVE NG/ML
RBC # BLD AUTO: 3.56 X10E6/UL (ref 3.77–5.28)
RH BLD: POSITIVE
RPR SER QL: NON REACTIVE
RUBV IGG SERPL IA-ACNC: 6.5 INDEX
WBC # BLD AUTO: 4.4 X10E3/UL (ref 3.4–10.8)

## 2021-01-04 DIAGNOSIS — Z34.91 FIRST TRIMESTER PREGNANCY: ICD-10-CM

## 2021-01-04 PROBLEM — R87.612 LOW GRADE SQUAMOUS INTRAEPITHELIAL LESION ON CYTOLOGIC SMEAR OF CERVIX (LGSIL): Status: ACTIVE | Noted: 2021-01-04

## 2021-01-04 NOTE — PROGRESS NOTES
Notify patient that she has an abnormal Pap smear.  It is recommended that she stops smoking.  I would recommend a colposcopy.  Patient is moving in 1 to make sure she knows this needs to be followed up with evaluation.

## 2021-01-08 NOTE — PROGRESS NOTES
Pt. Notified of results and recommendations. She was upset when I spoke with her, we discussed repeating pap smear PP and if she moves she needs to make sure they follow up on this after delivery. She VU.

## 2021-02-01 ENCOUNTER — ROUTINE PRENATAL (OUTPATIENT)
Dept: OBSTETRICS AND GYNECOLOGY | Facility: CLINIC | Age: 21
End: 2021-02-01

## 2021-02-01 VITALS — WEIGHT: 148 LBS | BODY MASS INDEX: 22.5 KG/M2 | DIASTOLIC BLOOD PRESSURE: 72 MMHG | SYSTOLIC BLOOD PRESSURE: 120 MMHG

## 2021-02-01 DIAGNOSIS — R87.612 LOW GRADE SQUAMOUS INTRAEPITHELIAL LESION ON CYTOLOGIC SMEAR OF CERVIX (LGSIL): ICD-10-CM

## 2021-02-01 DIAGNOSIS — Z3A.11 11 WEEKS GESTATION OF PREGNANCY: Primary | ICD-10-CM

## 2021-02-01 DIAGNOSIS — F12.10 MILD TETRAHYDROCANNABINOL (THC) ABUSE: ICD-10-CM

## 2021-02-01 DIAGNOSIS — F41.9 ANXIETY: ICD-10-CM

## 2021-02-01 PROBLEM — Z86.19 HISTORY OF MONONUCLEOSIS: Status: RESOLVED | Noted: 2019-06-08 | Resolved: 2021-02-01

## 2021-02-01 PROCEDURE — 57452 EXAM OF CERVIX W/SCOPE: CPT | Performed by: OBSTETRICS & GYNECOLOGY

## 2021-02-01 PROCEDURE — 0502F SUBSEQUENT PRENATAL CARE: CPT | Performed by: OBSTETRICS & GYNECOLOGY

## 2021-02-01 NOTE — PROGRESS NOTES
OB FOLLOW UP  CC- Here for care of pregnancy        Shyann Dave is a 20 y.o.  11w5d patient being seen today for her obstetrical follow up visit. Patient reports nausea.     Her prenatal care is complicated by (and status) :    Patient Active Problem List   Diagnosis   • History of mononucleosis   • Pregnancy   • Mild tetrahydrocannabinol (THC) abuse   • Anxiety   • Low grade squamous intraepithelial lesion on cytologic smear of cervix (LGSIL)       Desires genetic testing?: Yes with Gender  Flu Status: Will give in office today  Ultrasound Today: No.    ROS -   Patient Reports : Nausea  Patient Denies: Vaginal Spotting  Fetal Movement : too early  All other systems reviewed and are negative.     The additional following portions of the patient's history were reviewed and updated as appropriate: allergies, current medications, past family history, past medical history, past social history, past surgical history and problem list.    I have reviewed and agree with the HPI, ROS, and historical information as entered above. Felisha Mendoza MD    /72   Wt 67.1 kg (148 lb)   LMP  (LMP Unknown)   BMI 22.50 kg/m²         EXAM:     FHT: 152 BPM   Uterine Size: size equals dates  Pelvic Exam: yes-see colposcopy note.       Assessment and Plan    Problem List Items Addressed This Visit        Other    Pregnancy - Primary    Relevant Orders    Urine Drug Screen - Urine, Clean Catch    [id number] PLUS Core+SCA+ESS - Blood,          1. Pregnancy at 11w5d  2. Labs reviewed from New OB Visit.  3. Activity and Exercise discussed.  No follow-ups on file.    Felisha Mendoza MD  2021    Colposcopy Procedure Note  Colposcopy    Date/Time: 2021 9:39 AM  Performed by: Felisha Mendoza MD  Authorized by: Felisha Mendoza MD   Preparation: Patient was prepped and draped in the usual sterile fashion.  Local anesthesia used: no    Anesthesia:  Local anesthesia used:  no    Sedation:  Patient sedated: no    Patient tolerance: patient tolerated the procedure well with no immediate complications          Indications: Shyann Dave is a 20 y.o. female, , whose No LMP recorded (lmp unknown). Patient is pregnant..  She presents for follow up for evaluation of an abnormal PAP smear that showed low-grade squamous intraepithelial neoplasia (LGSIL - encompassing HPV,mild dysplasia,AMER[place] I).  She understands the need for the procedure and is aware of the complications, including post-colposcopic vaginal bleeding, vaginal leukorrhea or cervicitis.  She is aware she may experience discomfort.  After being presented with the risk, benefits, and alternatives the patient wished to proceed.      Prior cervical treatment: no treatment.    Procedure Details   The risks and benefits of the procedure and Verbal informed consent obtained.  She was positioned in the dorsal lithotomy position and a speculum was inserted into the vagina and excellent visualization of cervix achieved, cervix swabbed x 3 with acetic acid solution. The transformation zone was completely visualized.  A cervical biopsy was not obtained .  An endocervical curettage was not performed.  This colposcopy was satisfactory.     Findings:  The procedure was notable for:  Physical Exam  Vitals signs and nursing note reviewed. Exam conducted with a chaperone present.   Constitutional:       Appearance: Normal appearance. She is well-developed.   HENT:      Head: Normocephalic and atraumatic.   Pulmonary:      Effort: Pulmonary effort is normal.   Abdominal:      General: There is no distension.      Palpations: Abdomen is soft. Abdomen is not rigid. There is no mass.      Tenderness: There is no abdominal tenderness. There is no guarding or rebound.   Genitourinary:     Exam position: Lithotomy position.            Comments: Cervix: acetowhite lesion(s) noted at around cervical os.  No biopsy today secondary to pregnant  status.  Skin:     General: Skin is warm and dry.   Neurological:      Mental Status: She is alert and oriented to person, place, and time.   Psychiatric:         Mood and Affect: Mood normal.         Behavior: Behavior normal.             Complications: none.    Assessment and Plan    Problem List Items Addressed This Visit        Other    Pregnancy - Primary    Relevant Orders    Urine Drug Screen - Urine, Clean Catch    [id number] PLUS Core+SCA+ESS - Blood,          4. Will base further treatment on Pathology findings.  5. Treatment options discussed with patient.  6. Post biopsy instructions given to patient.  7. Repeat Pap postpartum    Felisha Mendoza MD  02/01/2021

## 2021-02-07 LAB
5P15 DELETION (CRI-DU-CHAT): NOT DETECTED
AMPHETAMINES UR QL SCN: NEGATIVE NG/ML
BARBITURATES UR QL SCN: NEGATIVE NG/ML
BENZODIAZ UR QL SCN: NEGATIVE NG/ML
BZE UR QL SCN: NEGATIVE NG/ML
CANNABINOIDS UR QL SCN: POSITIVE NG/ML
CFDNA.FET/CFDNA.TOTAL SFR FETUS: NORMAL %
CITATION REF LAB TEST: NORMAL
CREAT UR-MCNC: 227.7 MG/DL (ref 20–300)
FET 13+18+21+X+Y ANEUP PLAS.CFDNA: NEGATIVE
FET 1P36 DEL RISK WBC.DNA+CFDNA QL: NOT DETECTED
FET 22Q11.2 DEL RISK WBC.DNA+CFDNA QL: NOT DETECTED
FET CHR 11Q23 DEL PLAS.CFDNA QL: NOT DETECTED
FET CHR 15Q11 DEL PLAS.CFDNA QL: NOT DETECTED
FET CHR 21 TS PLAS.CFDNA QL: NEGATIVE
FET CHR 4P16 DEL PLAS.CFDNA QL: NOT DETECTED
FET CHR 8Q24 DEL PLAS.CFDNA QL: NOT DETECTED
FET MS X RISK WBC.DNA+CFDNA QL: NOT DETECTED
FET SEX PLAS.CFDNA DOSAGE CFDNA: NORMAL
FET TS 13 RISK PLAS.CFDNA QL: NEGATIVE
FET TS 18 RISK WBC.DNA+CFDNA QL: NEGATIVE
FET X + Y ANEUP RISK PLAS.CFDNA SEQ-IMP: NOT DETECTED
GA EST FROM CONCEPTION DATE: NORMAL D
GESTATIONAL AGE > 9:: YES
LAB DIRECTOR NAME PROVIDER: NORMAL
LAB DIRECTOR NAME PROVIDER: NORMAL
LABORATORY COMMENT REPORT: ABNORMAL
LABORATORY COMMENT REPORT: NORMAL
LIMITATIONS OF THE TEST: NORMAL
METHADONE UR QL SCN: NEGATIVE NG/ML
NEGATIVE PREDICTIVE VALUE: NORMAL
NOTE: NORMAL
OPIATES UR QL SCN: NEGATIVE NG/ML
OXYCODONE+OXYMORPHONE UR QL SCN: NEGATIVE NG/ML
PCP UR QL: NEGATIVE NG/ML
PERFORMANCE CHARACTERISTICS: NORMAL
PH UR: 5.8 [PH] (ref 4.5–8.9)
POSITIVE PREDICTIVE VALUE: NORMAL
PROPOXYPH UR QL SCN: NEGATIVE NG/ML
REF LAB TEST METHOD: NORMAL
TEST PERFORMANCE INFO SPEC: NORMAL
TRIOSOMY 16: NOT DETECTED
TRISOMY 22: NOT DETECTED

## 2021-02-17 ENCOUNTER — TELEPHONE (OUTPATIENT)
Dept: OBSTETRICS AND GYNECOLOGY | Facility: CLINIC | Age: 21
End: 2021-02-17

## 2021-02-17 ENCOUNTER — HOSPITAL ENCOUNTER (EMERGENCY)
Facility: HOSPITAL | Age: 21
Discharge: HOME | End: 2021-02-17
Attending: EMERGENCY MEDICINE | Admitting: EMERGENCY MEDICINE

## 2021-02-17 VITALS
BODY MASS INDEX: 20.73 KG/M2 | WEIGHT: 140 LBS | HEART RATE: 57 BPM | TEMPERATURE: 97.8 F | SYSTOLIC BLOOD PRESSURE: 115 MMHG | OXYGEN SATURATION: 100 % | DIASTOLIC BLOOD PRESSURE: 71 MMHG | RESPIRATION RATE: 24 BRPM | HEIGHT: 69 IN

## 2021-02-17 DIAGNOSIS — R10.9 ABDOMINAL CRAMPS: Primary | ICD-10-CM

## 2021-02-17 DIAGNOSIS — Z34.92 SECOND TRIMESTER PREGNANCY: ICD-10-CM

## 2021-02-17 LAB
ALBUMIN SERPL-MCNC: 4 G/DL (ref 3.5–5.2)
ALBUMIN/GLOB SERPL: 1.7 G/DL
ALP SERPL-CCNC: 44 U/L (ref 39–117)
ALT SERPL W P-5'-P-CCNC: <5 U/L (ref 1–33)
ANION GAP SERPL CALCULATED.3IONS-SCNC: 8 MMOL/L (ref 5–15)
AST SERPL-CCNC: 13 U/L (ref 1–32)
B-HCG UR QL: POSITIVE
BASOPHILS # BLD AUTO: 0.01 10*3/MM3 (ref 0–0.2)
BASOPHILS NFR BLD AUTO: 0.3 % (ref 0–1.5)
BILIRUB SERPL-MCNC: 0.3 MG/DL (ref 0–1.2)
BILIRUB UR QL STRIP: NEGATIVE
BUN SERPL-MCNC: 6 MG/DL (ref 6–20)
BUN/CREAT SERPL: 10.3 (ref 7–25)
CALCIUM SPEC-SCNC: 9.1 MG/DL (ref 8.6–10.5)
CHLORIDE SERPL-SCNC: 104 MMOL/L (ref 98–107)
CLARITY UR: CLEAR
CO2 SERPL-SCNC: 22 MMOL/L (ref 22–29)
COLOR UR: YELLOW
CREAT SERPL-MCNC: 0.58 MG/DL (ref 0.57–1)
DEPRECATED RDW RBC AUTO: 41.5 FL (ref 37–54)
EOSINOPHIL # BLD AUTO: 0.01 10*3/MM3 (ref 0–0.4)
EOSINOPHIL NFR BLD AUTO: 0.3 % (ref 0.3–6.2)
ERYTHROCYTE [DISTWIDTH] IN BLOOD BY AUTOMATED COUNT: 11.9 % (ref 12.3–15.4)
GFR SERPL CREATININE-BSD FRML MDRD: 131 ML/MIN/1.73
GLOBULIN UR ELPH-MCNC: 2.4 GM/DL
GLUCOSE SERPL-MCNC: 85 MG/DL (ref 65–99)
GLUCOSE UR STRIP-MCNC: NEGATIVE MG/DL
HCT VFR BLD AUTO: 36.8 % (ref 34–46.6)
HGB BLD-MCNC: 12.7 G/DL (ref 12–15.9)
HGB UR QL STRIP.AUTO: NEGATIVE
HOLD SPECIMEN: NORMAL
IMM GRANULOCYTES # BLD AUTO: 0.03 10*3/MM3 (ref 0–0.05)
IMM GRANULOCYTES NFR BLD AUTO: 0.8 % (ref 0–0.5)
INTERNAL NEGATIVE CONTROL: ABNORMAL
INTERNAL POSITIVE CONTROL: POSITIVE
KETONES UR QL STRIP: ABNORMAL
LEUKOCYTE ESTERASE UR QL STRIP.AUTO: NEGATIVE
LIPASE SERPL-CCNC: 14 U/L (ref 13–60)
LYMPHOCYTES # BLD AUTO: 1.22 10*3/MM3 (ref 0.7–3.1)
LYMPHOCYTES NFR BLD AUTO: 32.2 % (ref 19.6–45.3)
Lab: ABNORMAL
MCH RBC QN AUTO: 32.5 PG (ref 26.6–33)
MCHC RBC AUTO-ENTMCNC: 34.5 G/DL (ref 31.5–35.7)
MCV RBC AUTO: 94.1 FL (ref 79–97)
MONOCYTES # BLD AUTO: 0.41 10*3/MM3 (ref 0.1–0.9)
MONOCYTES NFR BLD AUTO: 10.8 % (ref 5–12)
NEUTROPHILS NFR BLD AUTO: 2.11 10*3/MM3 (ref 1.7–7)
NEUTROPHILS NFR BLD AUTO: 55.6 % (ref 42.7–76)
NITRITE UR QL STRIP: NEGATIVE
NRBC BLD AUTO-RTO: 0 /100 WBC (ref 0–0.2)
PH UR STRIP.AUTO: 7.5 [PH] (ref 5–8)
PLATELET # BLD AUTO: 192 10*3/MM3 (ref 140–450)
PMV BLD AUTO: 9.8 FL (ref 6–12)
POTASSIUM SERPL-SCNC: 4.4 MMOL/L (ref 3.5–5.2)
PROT SERPL-MCNC: 6.4 G/DL (ref 6–8.5)
PROT UR QL STRIP: NEGATIVE
QT INTERVAL: 412 MS
QTC INTERVAL: 435 MS
RBC # BLD AUTO: 3.91 10*6/MM3 (ref 3.77–5.28)
SODIUM SERPL-SCNC: 134 MMOL/L (ref 136–145)
SP GR UR STRIP: 1.02 (ref 1–1.03)
UROBILINOGEN UR QL STRIP: ABNORMAL
WBC # BLD AUTO: 3.79 10*3/MM3 (ref 3.4–10.8)
WHOLE BLOOD HOLD SPECIMEN: NORMAL
WHOLE BLOOD HOLD SPECIMEN: NORMAL

## 2021-02-17 PROCEDURE — 83690 ASSAY OF LIPASE: CPT | Performed by: EMERGENCY MEDICINE

## 2021-02-17 PROCEDURE — 81003 URINALYSIS AUTO W/O SCOPE: CPT | Performed by: EMERGENCY MEDICINE

## 2021-02-17 PROCEDURE — 99283 EMERGENCY DEPT VISIT LOW MDM: CPT

## 2021-02-17 PROCEDURE — 81025 URINE PREGNANCY TEST: CPT | Performed by: EMERGENCY MEDICINE

## 2021-02-17 PROCEDURE — 93005 ELECTROCARDIOGRAM TRACING: CPT | Performed by: EMERGENCY MEDICINE

## 2021-02-17 PROCEDURE — 80053 COMPREHEN METABOLIC PANEL: CPT | Performed by: EMERGENCY MEDICINE

## 2021-02-17 PROCEDURE — 85025 COMPLETE CBC W/AUTO DIFF WBC: CPT | Performed by: EMERGENCY MEDICINE

## 2021-02-17 RX ORDER — SODIUM CHLORIDE 9 MG/ML
10 INJECTION INTRAVENOUS AS NEEDED
Status: DISCONTINUED | OUTPATIENT
Start: 2021-02-17 | End: 2021-02-17 | Stop reason: HOSPADM

## 2021-02-17 NOTE — ED PROVIDER NOTES
EMERGENCY DEPARTMENT ENCOUNTER    Pt Name: Shyann Dave  MRN: [id number]  Pt :   2000  Room Number:    Date of encounter:  2021  PCP: Provider, No Known  ED Provider: Aaron Bergeron PA-C    Historian: Patient      HPI:  Chief Complaint: bilateral flank pain, LLQ abdominal pain, a mid sternal chest pain      Context: Shyann Dave is a  20 y.o. female who presents to the ED c/o severe pain that began at approximately 0300 this morning.  The patient states that she has been experiencing pain in her bilateral flank area which radiates around to her abdomen and up through her chest.  She reports that this has progressively worsened since 0300 and she occasionally experiences positional LLQ pain that is sharp in nature.  She has not taken any medications and has only attempted to relieve her pain with rest.  She is 14 weeks pregnant and contacted her OB, Dr. Mendoza, who recommended for her to come to the ED for further evaluation.  She endorses nausea, without vomiting, dizziness/lightheadedness, constipation, and increased urinary frequency with decreased output. She denies fever, pyuria, shortness of breath, cough, diarrhea, or other coryzal symptoms.      Past medical history is significant for gastroparesis, H. pylori, GERD, migraines, asthma, substance abuse, anxiety, undiagnosed depression, and frequent UTIs.    Past surgical history includes appendectomy, right shoulder reconstruction, and wisdom tooth extraction.    Family history is unknown as the patient states she was adopted.    Social history includes vaping and marijuana use daily.  She states that she smokes marijuana due to her decreased appetite and nausea from pregnancy.  She does not consume alcohol and denies illicit drug use.  She is currently on house arrest for being caught with marijuana.      She does not take any daily medications, but was previously prescribed hydroxyzine and lorazepam for her  "anxiety.  She does not take her prenatal multivitamin secondary to increased nausea, and states that Dr. Mendoza is aware.  She is not allergic to any medications.        PAST MEDICAL HISTORY  Active Ambulatory Problems     Diagnosis Date Noted   • Pregnancy 12/28/2020   • Mild tetrahydrocannabinol (THC) abuse 12/28/2020   • Anxiety 12/28/2020   • Low grade squamous intraepithelial lesion on cytologic smear of cervix (LGSIL) 01/04/2021     Resolved Ambulatory Problems     Diagnosis Date Noted   • Intractable pain 06/08/2019   • Spasm of muscle of lower back 06/08/2019   • Posterior cervical lymphadenopathy 06/08/2019   • Fever 06/08/2019   • History of mononucleosis 06/08/2019   • Dysuria 06/08/2019   • History of Helicobacter pylori infection 06/08/2019   • Acute UTI (prior to admission, presents on day #5 of Bactrim) 06/09/2019   • Urinary tract infection without hematuria 06/10/2019     Past Medical History:   Diagnosis Date   • Asthma    • Gastroparesis    • GERD (gastroesophageal reflux disease)    • H. pylori infection    • Menstrual migraine    • Pneumonia    • Substance abuse (CMS/[facility])    • Urinary tract infection          PAST SURGICAL HISTORY  Past Surgical History:   Procedure Laterality Date   • APPENDECTOMY     • SHOULDER SURGERY     • WISDOM TOOTH EXTRACTION           FAMILY HISTORY  History reviewed. No pertinent family history.      SOCIAL HISTORY  Social History     Socioeconomic History   • Marital status: Single     Spouse name: Not on file   • Number of children: Not on file   • Years of education: Not on file   • Highest education level: Not on file   Tobacco Use   • Smoking status: Current Every Day Smoker     Packs/day: 0.50     Types: Electronic Cigarette   • Smokeless tobacco: Never Used   • Tobacco comment: smokes \"jewel 3% menthol nicotine\" daily    Substance and Sexual Activity   • Alcohol use: No   • Drug use: Yes     Types: Marijuana     Comment: DAILY   • Sexual activity: Yes   Social " History Narrative    Single.  Lives along in her own home.  Active.  Swimmer.           ALLERGIES  [place] flavor, Morphine, Oxycodone, Penicillins, Pineapple, Augmentin [amoxicillin-pot clavulanate], Bactroban [mupirocin calcium], and Hydrocodone        REVIEW OF SYSTEMS  Review of Systems   Constitutional: Positive for appetite change and fatigue. Negative for activity change, diaphoresis and fever.   HENT: Negative for congestion, rhinorrhea and sore throat.    Respiratory: Negative for cough, chest tightness and shortness of breath.    Cardiovascular: Positive for chest pain. Negative for palpitations and leg swelling.   Gastrointestinal: Positive for abdominal pain and constipation. Negative for diarrhea, nausea and vomiting.   Genitourinary: Positive for decreased urine volume, flank pain and frequency. Negative for difficulty urinating, dysuria, hematuria and vaginal bleeding.   Musculoskeletal: Negative for arthralgias and myalgias.   Neurological: Positive for dizziness, light-headedness and headaches. Negative for weakness and numbness.   All other systems reviewed and are negative.       All systems reviewed and negative except for those discussed in HPI.       PHYSICAL EXAM    I have reviewed the triage vital signs and nursing notes.    ED Triage Vitals [02/17/21 1127]   Temp Heart Rate Resp BP SpO2   97.8 °F (36.6 °C) 82 24 109/63 100 %      Temp src Heart Rate Source Patient Position BP Location FiO2 (%)   Oral -- -- -- --       Physical Exam  GENERAL:  Alert and oriented.  She is non-toxic in appearance and hemodynamically stable.  She is afebrile.  HENT: Nares patent.  EYES: No scleral icterus.  CV: Regular rhythm, regular rate. No murmurs, rubs, or gallops.  RESPIRATORY: Normal effort.  No audible wheezes, rales, or rhonchi. No tachypnea, use of accessory muscles, or respiratory distress.   ABDOMEN: Soft. Normoactive bowel sounds.  Mild LLQ tenderness with palpation.  No guarding or rebound  tenderness.      MUSCULOSKELETAL: No deformities.   NEURO: Alert, moves all extremities, follows commands.  SKIN: Warm, dry, no rash visualized.    LAB RESULTS  Recent Results (from the past 24 hour(s))   ECG 12 Lead    Collection Time: 02/17/21 11:39 AM   Result Value Ref Range    QT Interval 412 ms    QTC Interval 435 ms   Comprehensive Metabolic Panel    Collection Time: 02/17/21 11:47 AM    Specimen: Arm, Left; Blood   Result Value Ref Range    Glucose 85 65 - 99 mg/dL    BUN 6 6 - 20 mg/dL    Creatinine 0.58 0.57 - 1.00 mg/dL    Sodium 134 (L) 136 - 145 mmol/L    Potassium 4.4 3.5 - 5.2 mmol/L    Chloride 104 98 - 107 mmol/L    CO2 22.0 22.0 - 29.0 mmol/L    Calcium 9.1 8.6 - 10.5 mg/dL    Total Protein 6.4 6.0 - 8.5 g/dL    Albumin 4.00 3.50 - 5.20 g/dL    ALT (SGPT) <5 1 - 33 U/L    AST (SGOT) 13 1 - 32 U/L    Alkaline Phosphatase 44 39 - 117 U/L    Total Bilirubin 0.3 0.0 - 1.2 mg/dL    eGFR Non African Amer 131 >60 mL/min/1.73    Globulin 2.4 gm/dL    A/G Ratio 1.7 g/dL    BUN/Creatinine Ratio 10.3 7.0 - 25.0    Anion Gap 8.0 5.0 - 15.0 mmol/L   Lipase    Collection Time: 02/17/21 11:47 AM    Specimen: Arm, Left; Blood   Result Value Ref Range    Lipase 14 13 - 60 U/L   Green Top (Gel)    Collection Time: 02/17/21 11:47 AM   Result Value Ref Range    Extra Tube Hold for add-ons.    Lavender Top    Collection Time: 02/17/21 11:47 AM   Result Value Ref Range    Extra Tube     Gold Top - SST    Collection Time: 02/17/21 11:47 AM   Result Value Ref Range    Extra Tube Hold for add-ons.    Gray Top - Ice    Collection Time: 02/17/21 11:47 AM   Result Value Ref Range    Extra Tube Hold for add-ons.    CBC Auto Differential    Collection Time: 02/17/21 11:47 AM    Specimen: Arm, Left; Blood   Result Value Ref Range    WBC 3.79 3.40 - 10.80 10*3/mm3    RBC 3.91 3.77 - 5.28 10*6/mm3    Hemoglobin 12.7 12.0 - 15.9 g/dL    Hematocrit 36.8 34.0 - 46.6 %    MCV 94.1 79.0 - 97.0 fL    MCH 32.5 26.6 - 33.0 pg    MCHC 34.5  31.5 - 35.7 g/dL    RDW 11.9 (L) 12.3 - 15.4 %    RDW-SD 41.5 37.0 - 54.0 fl    MPV 9.8 6.0 - 12.0 fL    Platelets 192 140 - 450 10*3/mm3    Neutrophil % 55.6 42.7 - 76.0 %    Lymphocyte % 32.2 19.6 - 45.3 %    Monocyte % 10.8 5.0 - 12.0 %    Eosinophil % 0.3 0.3 - 6.2 %    Basophil % 0.3 0.0 - 1.5 %    Immature Grans % 0.8 (H) 0.0 - 0.5 %    Neutrophils, Absolute 2.11 1.70 - 7.00 10*3/mm3    Lymphocytes, Absolute 1.22 0.70 - 3.10 10*3/mm3    Monocytes, Absolute 0.41 0.10 - 0.90 10*3/mm3    Eosinophils, Absolute 0.01 0.00 - 0.40 10*3/mm3    Basophils, Absolute 0.01 0.00 - 0.20 10*3/mm3    Immature Grans, Absolute 0.03 0.00 - 0.05 10*3/mm3    nRBC 0.0 0.0 - 0.2 /100 WBC   Urinalysis With Microscopic If Indicated (No Culture) - Urine, Clean Catch    Collection Time: 02/17/21 11:57 AM    Specimen: Urine, Clean Catch   Result Value Ref Range    Color, UA Yellow Yellow, Straw    Appearance, UA Clear Clear    pH, UA 7.5 5.0 - 8.0    Specific Gravity, UA 1.024 1.001 - 1.030    Glucose, UA Negative Negative    Ketones, UA 15 mg/dL (1+) (A) Negative    Bilirubin, UA Negative Negative    Blood, UA Negative Negative    Protein, UA Negative Negative    Leuk Esterase, UA Negative Negative    Nitrite, UA Negative Negative    Urobilinogen, UA 1.0 E.U./dL 0.2 - 1.0 E.U./dL   POC Pregnancy, Urine    Collection Time: 02/17/21 12:02 PM    Specimen: Urine   Result Value Ref Range    HCG, Urine, QL Positive (A) Negative    Lot Number [id number]     Internal Positive Control Positive     Internal Negative Control Presumptive Negative    Light Blue Top    Collection Time: 02/17/21 12:11 PM   Result Value Ref Range    Extra Tube hold for add-on        If labs were ordered, I independently reviewed the results.        RADIOLOGY  No Radiology Exams Resulted Within Past 24 Hours    PROCEDURES    Procedures    ECG 12 Lead   Final Result   Test Reason : cp   Blood Pressure : **/** mmHG   Vent. Rate : 067 BPM     Atrial Rate : 067 BPM      P-R  Int : 126 ms          QRS Dur : 090 ms       QT Int : 412 ms       P-R-T Axes : 029 058 031 degrees      QTc Int : 435 ms      Normal sinus rhythm with sinus arrhythmia   Normal ECG   No previous ECGs available   Confirmed by CAL JOHNSON MD ([id number]) on 2/17/2021 3:54:19 PM      Referred By:  EDMD           Confirmed By:CAL JOHNSON MD          MEDICATIONS GIVEN IN ER    Medications   Sodium Chloride (PF) 0.9 % 10 mL (has no administration in time range)         PROGRESS, DATA ANALYSIS, CONSULTS, AND MEDICAL DECISION MAKING    All labs have been independently reviewed by me.  All radiology studies have been reviewed by me and the radiologist dictating the report.   EKG's have been independently viewed and interpreted by me.           Fetal heart tones were recorded by patient's nurse at 170.  Long discussion with patient at the bedside regarding her abdominal pain and need for follow-up.  She was advised to follow-up with Dr. Mendoza tomorrow, and have a low threshold to return if she has any change or worsening of symptoms.  She verbalizes understanding and is agreeable to plan.      AS OF 16:36 EST VITALS:    BP - 115/71  HR - 57  TEMP - 97.8 °F (36.6 °C) (Oral)  O2 SATS - 100%        DIAGNOSIS  Final diagnoses:   Abdominal cramps   Second trimester pregnancy         DISPOSITION  DISCHARGE    Patient discharged in stable condition.    Reviewed implications of results, diagnosis, meds, responsibility to follow up, warning signs and symptoms of possible worsening, potential complications and reasons to return to ER.    Patient/Family voiced understanding of above instructions.    Discussed plan for discharge, as there is no emergent indication for admission.  Pt/family is agreeable and understands need for follow up and possible repeat testing.  Pt/family is aware that discharge does not mean that nothing is wrong but that it indicates no emergency is currently present that requires admission and they must  continue care with follow-up as given below or with a physician of their choice.     FOLLOW-UP  Felisha Mendoza MD  [address] [id number] [phone]    Schedule an appointment as soon as possible for a visit in 2 days  Mandatory recheck in 2 days.         Medication List      No changes were made to your prescriptions during this visit.                  Aaron Bergeron PA-C  02/17/21 [id number]

## 2021-02-17 NOTE — DISCHARGE INSTRUCTIONS
Increase your fluid and fiber intake.  Recheck in 2 days with Dr. Mendoza.  If unable to be seen by Dr. Mendoza, return to the emergency department for reevaluation.  Have very low threshold to return to the emergency department if you have any change or worsening of symptoms.

## 2021-02-17 NOTE — TELEPHONE ENCOUNTER
Isatu calls and she is crying.  States she is having low back pain that is so severe she can not stand up.  She is not running a fever, she does have frequent urination, urinating every 30 minutes but not much volume.    She is on house arrest so she has to get permission from parole office to leave her house.    I told her she needs to come for evaluation

## 2021-02-26 ENCOUNTER — TELEPHONE (OUTPATIENT)
Dept: OBSTETRICS AND GYNECOLOGY | Facility: CLINIC | Age: 21
End: 2021-02-26

## 2021-02-26 NOTE — TELEPHONE ENCOUNTER
Patient has questions about prenatal vitamins. States the vitamins she is taking are making her sick.

## 2021-02-26 NOTE — TELEPHONE ENCOUNTER
Pt. Reports she stopped taking her PNV due to the nausea with it. She started taking it again recently and reports feeling terrible now. She states she takes it at night and wakes up feeling fatigued and nauseous. We discussed try to take a multivitamin with 800mcg  Of folic acid instead to see if that helps. She VU and agrees.

## 2021-04-02 ENCOUNTER — TELEPHONE (OUTPATIENT)
Dept: OBSTETRICS AND GYNECOLOGY | Facility: CLINIC | Age: 21
End: 2021-04-02

## 2021-04-02 NOTE — TELEPHONE ENCOUNTER
Patient called 20w2d. She has moved to [place] but flying in next week and requests to be seen for appointment with SAMIRA. She has not had any prenatal care since OV 2/1/21. [facility] has not been able to find a provider in [place].     Patient does not plan to continue care with SAMIRA but demands to be seen next week. States 'it is as simple as you scheduling my appointment and letting me see a doctor.' I told patient SAMIRA has been out of office this week and schedule is limited for next week. Also if we are not managing her OB care SAMIRA will want her to establish care with MD locally. I instructed patient I would need to discuss with our  prior to scheduling. She states that she is 'busy at work' and unable to answer the phone. She instructed me that I just needed to schedule her for an appointment now. I instructed patient again I will have to discuss with  and offered to call her back and leave a voicemail if she is unable to answer. I stressed the importance of continuity of care and offered to help her find an OB locally if needed.     She states that she does not have [employer] insurance and it is hard to find a hospital that will take her insurance and cover her care. Patient became upset, yelling on the phone and stated that she would find someone else to see her. Patient hung up the phone.      NYLA Mayer notified.